# Patient Record
Sex: FEMALE | Race: WHITE | Employment: FULL TIME | URBAN - METROPOLITAN AREA
[De-identification: names, ages, dates, MRNs, and addresses within clinical notes are randomized per-mention and may not be internally consistent; named-entity substitution may affect disease eponyms.]

---

## 2017-05-16 ENCOUNTER — OFFICE VISIT (OUTPATIENT)
Dept: OBGYN CLINIC | Facility: CLINIC | Age: 22
End: 2017-05-16

## 2017-05-16 VITALS
WEIGHT: 143 LBS | DIASTOLIC BLOOD PRESSURE: 72 MMHG | SYSTOLIC BLOOD PRESSURE: 106 MMHG | HEART RATE: 92 BPM | BODY MASS INDEX: 25 KG/M2

## 2017-05-16 DIAGNOSIS — Z11.3 SCREEN FOR STD (SEXUALLY TRANSMITTED DISEASE): ICD-10-CM

## 2017-05-16 DIAGNOSIS — N89.8 VAGINAL DISCHARGE: ICD-10-CM

## 2017-05-16 DIAGNOSIS — Z01.411 ENCOUNTER FOR GYNECOLOGICAL EXAMINATION WITH ABNORMAL FINDING: Primary | ICD-10-CM

## 2017-05-16 DIAGNOSIS — Z30.011 ENCOUNTER FOR INITIAL PRESCRIPTION OF CONTRACEPTIVE PILLS: ICD-10-CM

## 2017-05-16 PROCEDURE — 99385 PREV VISIT NEW AGE 18-39: CPT | Performed by: ADVANCED PRACTICE MIDWIFE

## 2017-05-16 RX ORDER — LEVONORGESTREL / ETHINYL ESTRADIOL AND ETHINYL ESTRADIOL 150-30(84)
1 KIT ORAL DAILY
Qty: 1 PACKAGE | Refills: 11 | Status: SHIPPED | OUTPATIENT
Start: 2017-05-16 | End: 2017-08-15

## 2017-05-17 ENCOUNTER — LAB ENCOUNTER (OUTPATIENT)
Dept: LAB | Facility: HOSPITAL | Age: 22
End: 2017-05-17
Attending: Other
Payer: COMMERCIAL

## 2017-05-17 ENCOUNTER — HOSPITAL ENCOUNTER (OUTPATIENT)
Age: 22
Discharge: HOME | End: 2017-05-17
Payer: COMMERCIAL

## 2017-05-17 VITALS
BODY MASS INDEX: 23.54 KG/M2 | OXYGEN SATURATION: 100 % | SYSTOLIC BLOOD PRESSURE: 120 MMHG | WEIGHT: 143 LBS | HEIGHT: 65.5 IN | RESPIRATION RATE: 12 BRPM | HEART RATE: 95 BPM | TEMPERATURE: 98 F | DIASTOLIC BLOOD PRESSURE: 75 MMHG

## 2017-05-17 DIAGNOSIS — Z79.899 NEED FOR PROPHYLACTIC CHEMOTHERAPY: ICD-10-CM

## 2017-05-17 DIAGNOSIS — R53.83 FATIGUE: Primary | ICD-10-CM

## 2017-05-17 DIAGNOSIS — J02.0 STREP PHARYNGITIS: Primary | ICD-10-CM

## 2017-05-17 PROCEDURE — 87430 STREP A AG IA: CPT

## 2017-05-17 PROCEDURE — 99204 OFFICE O/P NEW MOD 45 MIN: CPT

## 2017-05-17 PROCEDURE — 84443 ASSAY THYROID STIM HORMONE: CPT

## 2017-05-17 PROCEDURE — 99213 OFFICE O/P EST LOW 20 MIN: CPT

## 2017-05-17 PROCEDURE — 82607 VITAMIN B-12: CPT

## 2017-05-17 PROCEDURE — 36415 COLL VENOUS BLD VENIPUNCTURE: CPT

## 2017-05-17 PROCEDURE — 82306 VITAMIN D 25 HYDROXY: CPT

## 2017-05-17 PROCEDURE — 85025 COMPLETE CBC W/AUTO DIFF WBC: CPT

## 2017-05-17 PROCEDURE — 82728 ASSAY OF FERRITIN: CPT

## 2017-05-17 PROCEDURE — 80053 COMPREHEN METABOLIC PANEL: CPT

## 2017-05-17 PROCEDURE — 80061 LIPID PANEL: CPT

## 2017-05-17 RX ORDER — ALBUTEROL SULFATE 90 UG/1
2 AEROSOL, METERED RESPIRATORY (INHALATION) EVERY 4 HOURS PRN
Qty: 1 INHALER | Refills: 0 | Status: SHIPPED | OUTPATIENT
Start: 2017-05-17 | End: 2017-06-16

## 2017-05-17 RX ORDER — AZITHROMYCIN 500 MG/1
500 TABLET, FILM COATED ORAL DAILY
Qty: 5 TABLET | Refills: 0 | Status: SHIPPED | OUTPATIENT
Start: 2017-05-17 | End: 2017-05-22

## 2017-05-17 RX ORDER — PREDNISONE 20 MG/1
40 TABLET ORAL DAILY
Qty: 8 TABLET | Refills: 0 | Status: SHIPPED | OUTPATIENT
Start: 2017-05-18 | End: 2017-05-22

## 2017-05-17 RX ORDER — PREDNISONE 20 MG/1
40 TABLET ORAL ONCE
Status: COMPLETED | OUTPATIENT
Start: 2017-05-17 | End: 2017-05-17

## 2017-05-17 NOTE — ED PROVIDER NOTES
Patient Seen in: [facility]    History   Patient presents with:  Sore Throat  Cough/URI    Stated Complaint: sore throat/cough    HPI  Patient is a 43-year-old female with a history of asthma who presents for evaluation o • Lung Disorder Maternal Grandfather    • Dementia Paternal Grandmother    • Cancer Paternal Grandfather          Smoking Status: Never Smoker                      Alcohol Use: Yes           0.0 oz/week       0 Standard drinks or equivalent per week light.   Neck: Normal range of motion. Neck supple. Cardiovascular: Normal rate, regular rhythm, normal heart sounds and intact distal pulses. Pulmonary/Chest: Effort normal and breath sounds normal. No respiratory distress. She has no wheezes.  She ha

## 2017-05-18 ENCOUNTER — TELEPHONE (OUTPATIENT)
Dept: OBGYN CLINIC | Facility: CLINIC | Age: 22
End: 2017-05-18

## 2017-05-18 NOTE — TELEPHONE ENCOUNTER
Pt asking for pap smear results. Informed pt that our office will call her back with results once MES, CNM review them first. Pt verbalized understanding and agrees with plan.

## 2017-05-20 ENCOUNTER — TELEPHONE (OUTPATIENT)
Dept: OBGYN CLINIC | Facility: CLINIC | Age: 22
End: 2017-05-20

## 2017-05-20 NOTE — TELEPHONE ENCOUNTER
Per velasquez Gonzalez to inform patient of negative Pap, GC/CT, and vaginosis screen results. Patient informed and she verbalized understanding.

## 2017-05-22 NOTE — PROGRESS NOTES
HPI:    Patient ID: Latasha Womack is a 20year old female who presents for her annual exam. Desires OCP. Pt reports vaginal d/c. HPI    Review of Systems   Constitutional: Negative. Respiratory: Negative. Cardiovascular: Negative.     [employer] the left labia. Cervix exhibits no motion tenderness and no discharge. No tenderness or bleeding in the vagina. No foreign body around the vagina. Vaginal discharge found. Neurological: She is oriented to person, place, and time.    Skin: Skin is warm and

## 2017-06-13 ENCOUNTER — TELEPHONE (OUTPATIENT)
Dept: OBGYN CLINIC | Facility: CLINIC | Age: 22
End: 2017-06-13

## 2017-06-13 NOTE — TELEPHONE ENCOUNTER
Pt states she was started on new ocp & was feeling depressed so she stopped. Now having a bit of cramping & spotting. Reassured pt that it was normal. Advised pt to use another form of birth control as she is not protected.  Pt explained that she was placed

## 2017-12-20 ENCOUNTER — HOSPITAL ENCOUNTER (OUTPATIENT)
Age: 22
Discharge: HOME | End: 2017-12-20
Attending: FAMILY MEDICINE
Payer: COMMERCIAL

## 2017-12-20 VITALS
HEART RATE: 78 BPM | RESPIRATION RATE: 16 BRPM | TEMPERATURE: 98 F | OXYGEN SATURATION: 100 % | DIASTOLIC BLOOD PRESSURE: 72 MMHG | SYSTOLIC BLOOD PRESSURE: 126 MMHG

## 2017-12-20 DIAGNOSIS — J02.9 ACUTE VIRAL PHARYNGITIS: Primary | ICD-10-CM

## 2017-12-20 DIAGNOSIS — T14.8XXA INFECTED ABRASION: ICD-10-CM

## 2017-12-20 DIAGNOSIS — L08.9 INFECTED ABRASION: ICD-10-CM

## 2017-12-20 PROCEDURE — 99214 OFFICE O/P EST MOD 30 MIN: CPT

## 2017-12-20 PROCEDURE — 87430 STREP A AG IA: CPT

## 2017-12-20 PROCEDURE — 99213 OFFICE O/P EST LOW 20 MIN: CPT

## 2017-12-20 RX ORDER — AMOXICILLIN AND CLAVULANATE POTASSIUM 875; 125 MG/1; MG/1
1 TABLET, FILM COATED ORAL 2 TIMES DAILY
Qty: 14 TABLET | Refills: 0 | Status: SHIPPED | OUTPATIENT
Start: 2017-12-20 | End: 2017-12-27

## 2017-12-20 RX ORDER — AMOXICILLIN AND CLAVULANATE POTASSIUM 875; 125 MG/1; MG/1
1 TABLET, FILM COATED ORAL 2 TIMES DAILY
Qty: 14 TABLET | Refills: 0 | Status: SHIPPED | OUTPATIENT
Start: 2017-12-20 | End: 2017-12-20

## 2017-12-21 NOTE — ED PROVIDER NOTES
Patient Seen in: [facility]    History   Patient presents with:  Sore Throat    Stated Complaint: Sore Throat    HPI    Here with a sore throat. ×1 day. Denies any fevers, chills, sweats. Denies any other URI symptoms. ED Course as of Dec 20 2009  ------------------------------------------------------------       [facility]       Disposition and Plan     Clinical Impression:  Acute viral pharyngitis  (primary encounter diagnosis)  Infected abrasion     Likely viral pharyn

## 2024-02-20 ENCOUNTER — HOSPITAL ENCOUNTER (EMERGENCY)
Facility: HOSPITAL | Age: 29
Discharge: HOME | End: 2024-02-20
Attending: EMERGENCY MEDICINE
Payer: MEDICAID

## 2024-02-20 ENCOUNTER — APPOINTMENT (OUTPATIENT)
Dept: GENERAL RADIOLOGY | Facility: HOSPITAL | Age: 29
End: 2024-02-20
Payer: MEDICAID

## 2024-02-20 ENCOUNTER — APPOINTMENT (OUTPATIENT)
Dept: ULTRASOUND IMAGING | Facility: HOSPITAL | Age: 29
End: 2024-02-20
Attending: EMERGENCY MEDICINE
Payer: MEDICAID

## 2024-02-20 VITALS
BODY MASS INDEX: 34.16 KG/M2 | RESPIRATION RATE: 18 BRPM | HEART RATE: 58 BPM | WEIGHT: 205 LBS | HEIGHT: 65 IN | SYSTOLIC BLOOD PRESSURE: 131 MMHG | OXYGEN SATURATION: 99 % | TEMPERATURE: 98 F | DIASTOLIC BLOOD PRESSURE: 93 MMHG

## 2024-02-20 DIAGNOSIS — R07.9 ACUTE CHEST PAIN: Primary | ICD-10-CM

## 2024-02-20 LAB
ALBUMIN SERPL-MCNC: 4.5 G/DL (ref 3.2–4.8)
ALBUMIN/GLOB SERPL: 1.8 {RATIO} (ref 1–2)
ALP LIVER SERPL-CCNC: 114 U/L
ALT SERPL-CCNC: 41 U/L
ANION GAP SERPL CALC-SCNC: 5 MMOL/L (ref 0–18)
AST SERPL-CCNC: 26 U/L (ref ?–34)
ATRIAL RATE: 60 BPM
B-HCG UR QL: NEGATIVE
BASOPHILS # BLD AUTO: 0.03 X10(3) UL (ref 0–0.2)
BASOPHILS NFR BLD AUTO: 0.3 %
BILIRUB SERPL-MCNC: 0.6 MG/DL (ref 0.3–1.2)
BUN BLD-MCNC: 10 MG/DL (ref 9–23)
BUN/CREAT SERPL: 16.1 (ref 10–20)
CALCIUM BLD-MCNC: 9.6 MG/DL (ref 8.7–10.4)
CHLORIDE SERPL-SCNC: 107 MMOL/L (ref 98–112)
CO2 SERPL-SCNC: 24 MMOL/L (ref 21–32)
CREAT BLD-MCNC: 0.62 MG/DL
DEPRECATED RDW RBC AUTO: 36.9 FL (ref 35.1–46.3)
EGFRCR SERPLBLD CKD-EPI 2021: 124 ML/MIN/1.73M2 (ref 60–?)
EOSINOPHIL # BLD AUTO: 0.1 X10(3) UL (ref 0–0.7)
EOSINOPHIL NFR BLD AUTO: 0.9 %
ERYTHROCYTE [DISTWIDTH] IN BLOOD BY AUTOMATED COUNT: 11.8 % (ref 11–15)
GLOBULIN PLAS-MCNC: 2.5 G/DL (ref 2.8–4.4)
GLUCOSE BLD-MCNC: 99 MG/DL (ref 70–99)
HCT VFR BLD AUTO: 40.9 %
HGB BLD-MCNC: 14.5 G/DL
IMM GRANULOCYTES # BLD AUTO: 0.02 X10(3) UL (ref 0–1)
IMM GRANULOCYTES NFR BLD: 0.2 %
LIPASE SERPL-CCNC: 30 U/L (ref 13–75)
LYMPHOCYTES # BLD AUTO: 2 X10(3) UL (ref 1–4)
LYMPHOCYTES NFR BLD AUTO: 18.9 %
MCH RBC QN AUTO: 30.5 PG (ref 26–34)
MCHC RBC AUTO-ENTMCNC: 35.5 G/DL (ref 31–37)
MCV RBC AUTO: 85.9 FL
MONOCYTES # BLD AUTO: 0.81 X10(3) UL (ref 0.1–1)
MONOCYTES NFR BLD AUTO: 7.6 %
NEUTROPHILS # BLD AUTO: 7.64 X10 (3) UL (ref 1.5–7.7)
NEUTROPHILS # BLD AUTO: 7.64 X10(3) UL (ref 1.5–7.7)
NEUTROPHILS NFR BLD AUTO: 72.1 %
OSMOLALITY SERPL CALC.SUM OF ELEC: 281 MOSM/KG (ref 275–295)
P AXIS: 10 DEGREES
P-R INTERVAL: 116 MS
PLATELET # BLD AUTO: 347 10(3)UL (ref 150–450)
POTASSIUM SERPL-SCNC: 4.3 MMOL/L (ref 3.5–5.1)
PROT SERPL-MCNC: 7 G/DL (ref 5.7–8.2)
Q-T INTERVAL: 420 MS
QRS DURATION: 86 MS
QTC CALCULATION (BEZET): 420 MS
R AXIS: 31 DEGREES
RBC # BLD AUTO: 4.76 X10(6)UL
SODIUM SERPL-SCNC: 136 MMOL/L (ref 136–145)
T AXIS: 13 DEGREES
TROPONIN I SERPL HS-MCNC: <3 NG/L
VENTRICULAR RATE: 60 BPM
WBC # BLD AUTO: 10.6 X10(3) UL (ref 4–11)

## 2024-02-20 PROCEDURE — 71045 X-RAY EXAM CHEST 1 VIEW: CPT | Performed by: EMERGENCY MEDICINE

## 2024-02-20 PROCEDURE — 80053 COMPREHEN METABOLIC PANEL: CPT

## 2024-02-20 PROCEDURE — 36415 COLL VENOUS BLD VENIPUNCTURE: CPT

## 2024-02-20 PROCEDURE — 99285 EMERGENCY DEPT VISIT HI MDM: CPT

## 2024-02-20 PROCEDURE — 84484 ASSAY OF TROPONIN QUANT: CPT | Performed by: EMERGENCY MEDICINE

## 2024-02-20 PROCEDURE — 81025 URINE PREGNANCY TEST: CPT

## 2024-02-20 PROCEDURE — 85025 COMPLETE CBC W/AUTO DIFF WBC: CPT | Performed by: EMERGENCY MEDICINE

## 2024-02-20 PROCEDURE — 93010 ELECTROCARDIOGRAM REPORT: CPT

## 2024-02-20 PROCEDURE — 93005 ELECTROCARDIOGRAM TRACING: CPT

## 2024-02-20 PROCEDURE — 80053 COMPREHEN METABOLIC PANEL: CPT | Performed by: EMERGENCY MEDICINE

## 2024-02-20 PROCEDURE — 99284 EMERGENCY DEPT VISIT MOD MDM: CPT

## 2024-02-20 PROCEDURE — 85025 COMPLETE CBC W/AUTO DIFF WBC: CPT

## 2024-02-20 PROCEDURE — 76705 ECHO EXAM OF ABDOMEN: CPT | Performed by: EMERGENCY MEDICINE

## 2024-02-20 PROCEDURE — 84484 ASSAY OF TROPONIN QUANT: CPT

## 2024-02-20 PROCEDURE — 83690 ASSAY OF LIPASE: CPT | Performed by: EMERGENCY MEDICINE

## 2024-02-20 RX ORDER — DICYCLOMINE HYDROCHLORIDE 10 MG/5ML
20 SOLUTION ORAL ONCE
Status: COMPLETED | OUTPATIENT
Start: 2024-02-20 | End: 2024-02-20

## 2024-02-20 RX ORDER — MAGNESIUM HYDROXIDE/ALUMINUM HYDROXICE/SIMETHICONE 120; 1200; 1200 MG/30ML; MG/30ML; MG/30ML
30 SUSPENSION ORAL ONCE
Status: COMPLETED | OUTPATIENT
Start: 2024-02-20 | End: 2024-02-20

## 2024-02-20 NOTE — ED PROVIDER NOTES
Patient Seen in: [facility] Emergency Department      History     Chief Complaint   Patient presents with    Chest Pain     Stated Complaint: chest pain    Subjective:   HPI    28-year-old female presents for evaluation for chest pain.  Pain began last night, was retrosternal, sharp, radiated to the arm.  It is currently better.  Mother endorses that she ate a lot of cheese prior to onset of her symptoms.  She does report some occasional heartburn.  There was some shortness of breath with it.  She also felt nauseated.  She denies any fevers, chills, vomiting, diarrhea, constipation, dysuria, hematuria, leg pain or swelling, recent travel or prolonged immobilization, smoking, use of birth control.  Mother reports a family history of gallstones.    Objective:   Past Medical History:   Diagnosis Date    Depression 2012    Lipid screening 08/31/2013    Mild intermittent asthma (HCC)     Multiple allergies               Past Surgical History:   Procedure Laterality Date    OTHER SURGICAL HISTORY Left 2009    Facial cyst removed                Social History     Socioeconomic History    Marital status: Single   Tobacco Use    Smoking status: Never    Smokeless tobacco: Never   Substance and Sexual Activity    Alcohol use: Yes     Alcohol/week: 0.0 standard drinks of alcohol     Comment: sometimes    Drug use: No   Other Topics Concern    Caffeine Concern Yes     Comment: 1 cups coffee daily/3 sodas weekly    Reaction to local anesthetic No              Review of Systems    Positive for stated complaint: chest pain  Other systems are as noted in HPI.  Constitutional and vital signs reviewed.      All other systems reviewed and negative except as noted above.    Physical Exam     ED Triage Vitals [02/20/24 0917]   /79   Pulse 69   Resp 20   Temp 98.2 °F (36.8 °C)   Temp src Oral   SpO2 96 %   O2 Device None (Room air)       Current:/89   Pulse 57   Temp 98.2 °F (36.8 °C) (Oral)   Resp 18   Ht 165.1  cm (5' 5\")   Wt 93 kg   LMP 02/11/2024   SpO2 98%   BMI 34.11 kg/m²         Physical Exam  Vitals and nursing note reviewed.   Constitutional:       Appearance: Normal appearance.   HENT:      Head: Normocephalic and atraumatic.   Cardiovascular:      Rate and Rhythm: Normal rate and regular rhythm.      Pulses: Normal pulses.           Radial pulses are 2+ on the right side and 2+ on the left side.      Heart sounds: Normal heart sounds.   Pulmonary:      Effort: Pulmonary effort is normal.      Breath sounds: Normal breath sounds and air entry.   Abdominal:      General: Bowel sounds are normal.      Palpations: Abdomen is soft.      Tenderness: There is abdominal tenderness in the epigastric area. There is no guarding or rebound.   Musculoskeletal:         General: Normal range of motion.      Cervical back: Normal range of motion.      Right lower leg: No edema.      Left lower leg: No edema.   Skin:     General: Skin is warm and dry.   Neurological:      General: No focal deficit present.      Mental Status: She is alert.               ED Course     Labs Reviewed   COMP METABOLIC PANEL (14) - Abnormal; Notable for the following components:       Result Value    Alkaline Phosphatase 114 (*)     Globulin  2.5 (*)     All other components within normal limits   TROPONIN I HIGH SENSITIVITY - Normal   LIPASE - Normal   POCT PREGNANCY URINE - Normal   CBC WITH DIFFERENTIAL WITH PLATELET    Narrative:     The following orders were created for panel order CBC With Differential With Platelet.  Procedure                               Abnormality         Status                     ---------                               -----------         ------                     CBC W/ DIFFERENTIAL[[id number]]                              Final result                 Please view results for these tests on the individual orders.   CBC W/ DIFFERENTIAL     EKG    Rate, intervals and axes as noted on EKG Report.  Rate: 60  Rhythm:  Sinus Rhythm  Reading: no stemi, interpreted by myself.              ED Course as of 02/20/24 1303  ------------------------------------------------------------  Time: 02/20 1059  Value: XR CHEST AP PORTABLE  (CPT=71045)  Comment: Per my independent interpretation, patient's CXR demonstrates no pneumonia.    ------------------------------------------------------------  Time: 02/20 1256  Value: US GALLBLADDER (CPT=76705)  Comment: Per my independent interpretation, patient's US Gallbladder demonstrates no gallstones.                MDM                                         Medical Decision Making  Differential diagnosis includes but is not limited to ACS, pneumonia, PE, pneumothorax, costochondritis, referred abdominal pain    Patient is PERC negative, I do not suspect PE.  CT chest considered for PE but not obtained given that patient is PERC negative.  CBC and CMP were unremarkable.  Lipase is normal.  I do not suspect pancreatitis.  Ultrasound negative for any cholelithiasis.  Chest x-ray is also negative.  Troponin normal, do not suspect any ACS.  Unsure etiology for symptoms at this time, could have been gastritis or reflux.  She is currently feeling better.  She is discharged with dietary instructions, return precautions and is advised to follow-up with PCP.    Rhythm Strip: Rate 58 sinus rhythm. The cardiac monitor was ordered secondary to the patient's chest pain.     Medical Record Review: I personally reviewed available prior medical records for any recent pertinent discharge summaries, testing, and procedures, and reviewed those reports.    Complicating factors: The patient  has a past medical history of Depression (2012), Lipid screening (08/31/2013), Mild intermittent asthma (HCC), and Multiple allergies. and  has a past surgical history that includes other surgical history (Left, 2009). that contribute to the medical complexity of this ED evaluation.     Clinical impression as well as any lab results  and radiology findings were discussed with the patient and/or caregiver. I personally reviewed all laboratory results and radiology images myself. Patient is advised to follow up with PCP for reevaluation. I provided discharge instructions and return precautions. Patient and/or caregiver voices understanding and agreement with the treatment plan. All questions were addressed and answered.         Problems Addressed:  Acute chest pain: acute illness or injury with systemic symptoms    Amount and/or Complexity of Data Reviewed  Independent Historian: parent  Labs: ordered. Decision-making details documented in ED Course.  Radiology: ordered and independent interpretation performed. Decision-making details documented in ED Course.  ECG/medicine tests: ordered and independent interpretation performed. Decision-making details documented in ED Course.    Risk  OTC drugs.        Disposition and Plan     Clinical Impression:  1. Acute chest pain         Disposition:  Discharge  2/20/2024  1:02 pm    Follow-up:  Pablo Ferreira MD  [address] [place] [address]  [phone]    Schedule an appointment as soon as possible for a visit  For follow up and re-evaluation          Medications Prescribed:  Current Discharge Medication List

## 2024-02-20 NOTE — ED INITIAL ASSESSMENT (HPI)
Pt with history of asthma came in for intermittent  \"horrible\" chest pain since last night.  With nausea.  With mild shortness of breath.  Pt is A/Ox  4, breathing unlabored.  Took Advil 3 tabs last night, per pt it did not help.  Pt denies cough, congestion, fever.

## 2024-04-11 ENCOUNTER — OFFICE VISIT (OUTPATIENT)
Dept: INTERNAL MEDICINE CLINIC | Facility: CLINIC | Age: 29
End: 2024-04-11

## 2024-04-11 ENCOUNTER — TELEPHONE (OUTPATIENT)
Dept: INTERNAL MEDICINE CLINIC | Facility: CLINIC | Age: 29
End: 2024-04-11

## 2024-04-11 ENCOUNTER — ORDER TRANSCRIPTION (OUTPATIENT)
Dept: ADMINISTRATIVE | Facility: HOSPITAL | Age: 29
End: 2024-04-11

## 2024-04-11 ENCOUNTER — TELEPHONE (OUTPATIENT)
Dept: ENDOCRINOLOGY | Facility: HOSPITAL | Age: 29
End: 2024-04-11

## 2024-04-11 ENCOUNTER — LAB ENCOUNTER (OUTPATIENT)
Dept: LAB | Age: 29
End: 2024-04-11
Attending: NURSE PRACTITIONER
Payer: MEDICAID

## 2024-04-11 VITALS
BODY MASS INDEX: 32.32 KG/M2 | HEIGHT: 65 IN | HEART RATE: 79 BPM | DIASTOLIC BLOOD PRESSURE: 74 MMHG | WEIGHT: 194 LBS | SYSTOLIC BLOOD PRESSURE: 112 MMHG

## 2024-04-11 DIAGNOSIS — Z13.29 THYROID DISORDER SCREEN: ICD-10-CM

## 2024-04-11 DIAGNOSIS — Z13.0 SCREENING FOR DEFICIENCY ANEMIA: ICD-10-CM

## 2024-04-11 DIAGNOSIS — Z11.3 VENEREAL DISEASE SCREENING: ICD-10-CM

## 2024-04-11 DIAGNOSIS — Z13.21 ENCOUNTER FOR VITAMIN DEFICIENCY SCREENING: ICD-10-CM

## 2024-04-11 DIAGNOSIS — Z12.4 ENCOUNTER FOR SCREENING FOR MALIGNANT NEOPLASM OF CERVIX: ICD-10-CM

## 2024-04-11 DIAGNOSIS — Z00.00 WELLNESS EXAMINATION: ICD-10-CM

## 2024-04-11 DIAGNOSIS — E66.9 OBESITY (BMI 30.0-34.9): Primary | ICD-10-CM

## 2024-04-11 DIAGNOSIS — F31.9 BIPOLAR AFFECTIVE DISORDER, REMISSION STATUS UNSPECIFIED (HCC): ICD-10-CM

## 2024-04-11 DIAGNOSIS — Z13.220 LIPID SCREENING: ICD-10-CM

## 2024-04-11 DIAGNOSIS — D72.829 LEUKOCYTOSIS, UNSPECIFIED TYPE: Primary | ICD-10-CM

## 2024-04-11 DIAGNOSIS — E66.9 OBESITY: Primary | ICD-10-CM

## 2024-04-11 DIAGNOSIS — N76.0 VAGINOSIS: ICD-10-CM

## 2024-04-11 DIAGNOSIS — R79.89 ELEVATED LFTS: ICD-10-CM

## 2024-04-11 DIAGNOSIS — E66.9 OBESITY (BMI 30.0-34.9): ICD-10-CM

## 2024-04-11 DIAGNOSIS — J45.909 EXTRINSIC ASTHMA WITHOUT COMPLICATION, UNSPECIFIED ASTHMA SEVERITY, UNSPECIFIED WHETHER PERSISTENT (HCC): ICD-10-CM

## 2024-04-11 DIAGNOSIS — K52.9 CHRONIC DIARRHEA: ICD-10-CM

## 2024-04-11 DIAGNOSIS — R35.0 URINARY FREQUENCY: ICD-10-CM

## 2024-04-11 DIAGNOSIS — Z00.00 WELLNESS EXAMINATION: Primary | ICD-10-CM

## 2024-04-11 DIAGNOSIS — N88.8 FRIABLE CERVIX: ICD-10-CM

## 2024-04-11 LAB
ALBUMIN SERPL-MCNC: 5.2 G/DL (ref 3.2–4.8)
ALBUMIN/GLOB SERPL: 1.9 {RATIO} (ref 1–2)
ALP LIVER SERPL-CCNC: 121 U/L
ALT SERPL-CCNC: 42 U/L
ANION GAP SERPL CALC-SCNC: 12 MMOL/L (ref 0–18)
AST SERPL-CCNC: 71 U/L (ref ?–34)
BASOPHILS # BLD AUTO: 0.04 X10(3) UL (ref 0–0.2)
BASOPHILS NFR BLD AUTO: 0.3 %
BILIRUB SERPL-MCNC: 1.1 MG/DL (ref 0.3–1.2)
BILIRUB UR QL: NEGATIVE
BUN BLD-MCNC: 11 MG/DL (ref 9–23)
BUN/CREAT SERPL: 16.9 (ref 10–20)
C TRACH DNA SPEC QL NAA+PROBE: NEGATIVE
CALCIUM BLD-MCNC: 9.9 MG/DL (ref 8.7–10.4)
CHLORIDE SERPL-SCNC: 101 MMOL/L (ref 98–112)
CHOLEST SERPL-MCNC: 193 MG/DL (ref ?–200)
CLARITY UR: CLEAR
CO2 SERPL-SCNC: 23 MMOL/L (ref 21–32)
CREAT BLD-MCNC: 0.65 MG/DL
DEPRECATED RDW RBC AUTO: 37.2 FL (ref 35.1–46.3)
EGFRCR SERPLBLD CKD-EPI 2021: 122 ML/MIN/1.73M2 (ref 60–?)
EOSINOPHIL # BLD AUTO: 0.11 X10(3) UL (ref 0–0.7)
EOSINOPHIL NFR BLD AUTO: 0.9 %
ERYTHROCYTE [DISTWIDTH] IN BLOOD BY AUTOMATED COUNT: 11.8 % (ref 11–15)
EST. AVERAGE GLUCOSE BLD GHB EST-MCNC: 91 MG/DL (ref 68–126)
FASTING PATIENT LIPID ANSWER: YES
FASTING STATUS PATIENT QL REPORTED: YES
GLOBULIN PLAS-MCNC: 2.7 G/DL (ref 2.8–4.4)
GLUCOSE BLD-MCNC: 61 MG/DL (ref 70–99)
GLUCOSE UR-MCNC: NORMAL MG/DL
HAV IGM SER QL: NONREACTIVE
HBA1C MFR BLD: 4.8 % (ref ?–5.7)
HBV CORE IGM SER QL: NONREACTIVE
HBV SURFACE AG SERPL QL IA: NONREACTIVE
HCT VFR BLD AUTO: 43.1 %
HCV AB SERPL QL IA: NONREACTIVE
HDLC SERPL-MCNC: 57 MG/DL (ref 40–59)
HGB BLD-MCNC: 14.9 G/DL
IMM GRANULOCYTES # BLD AUTO: 0.03 X10(3) UL (ref 0–1)
IMM GRANULOCYTES NFR BLD: 0.2 %
KETONES UR-MCNC: 60 MG/DL
LDLC SERPL CALC-MCNC: 121 MG/DL (ref ?–100)
LEUKOCYTE ESTERASE UR QL STRIP.AUTO: 25
LYMPHOCYTES # BLD AUTO: 2.16 X10(3) UL (ref 1–4)
LYMPHOCYTES NFR BLD AUTO: 17.7 %
MCH RBC QN AUTO: 29.9 PG (ref 26–34)
MCHC RBC AUTO-ENTMCNC: 34.6 G/DL (ref 31–37)
MCV RBC AUTO: 86.5 FL
MONOCYTES # BLD AUTO: 0.78 X10(3) UL (ref 0.1–1)
MONOCYTES NFR BLD AUTO: 6.4 %
N GONORRHOEA DNA SPEC QL NAA+PROBE: NEGATIVE
NEUTROPHILS # BLD AUTO: 9.06 X10 (3) UL (ref 1.5–7.7)
NEUTROPHILS # BLD AUTO: 9.06 X10(3) UL (ref 1.5–7.7)
NEUTROPHILS NFR BLD AUTO: 74.5 %
NITRITE UR QL STRIP.AUTO: NEGATIVE
NONHDLC SERPL-MCNC: 136 MG/DL (ref ?–130)
OSMOLALITY SERPL CALC.SUM OF ELEC: 279 MOSM/KG (ref 275–295)
PH UR: 5 [PH] (ref 5–8)
PLATELET # BLD AUTO: 379 10(3)UL (ref 150–450)
POTASSIUM SERPL-SCNC: 3.6 MMOL/L (ref 3.5–5.1)
PROT SERPL-MCNC: 7.9 G/DL (ref 5.7–8.2)
PROT UR-MCNC: NEGATIVE MG/DL
RBC # BLD AUTO: 4.98 X10(6)UL
SODIUM SERPL-SCNC: 136 MMOL/L (ref 136–145)
SP GR UR STRIP: 1.01 (ref 1–1.03)
T PALLIDUM AB SER QL IA: NONREACTIVE
TRIGL SERPL-MCNC: 85 MG/DL (ref 30–149)
TSI SER-ACNC: 1.31 MIU/ML (ref 0.55–4.78)
UROBILINOGEN UR STRIP-ACNC: NORMAL
VIT D+METAB SERPL-MCNC: 37.8 NG/ML (ref 30–100)
VLDLC SERPL CALC-MCNC: 15 MG/DL (ref 0–30)
WBC # BLD AUTO: 12.2 X10(3) UL (ref 4–11)

## 2024-04-11 PROCEDURE — 80061 LIPID PANEL: CPT

## 2024-04-11 PROCEDURE — 81514 NFCT DS BV&VAGINITIS DNA ALG: CPT | Performed by: NURSE PRACTITIONER

## 2024-04-11 PROCEDURE — 87491 CHLMYD TRACH DNA AMP PROBE: CPT

## 2024-04-11 PROCEDURE — 81001 URINALYSIS AUTO W/SCOPE: CPT

## 2024-04-11 PROCEDURE — 85025 COMPLETE CBC W/AUTO DIFF WBC: CPT

## 2024-04-11 PROCEDURE — 82306 VITAMIN D 25 HYDROXY: CPT

## 2024-04-11 PROCEDURE — 87624 HPV HI-RISK TYP POOLED RSLT: CPT | Performed by: NURSE PRACTITIONER

## 2024-04-11 PROCEDURE — 87389 HIV-1 AG W/HIV-1&-2 AB AG IA: CPT

## 2024-04-11 PROCEDURE — 83036 HEMOGLOBIN GLYCOSYLATED A1C: CPT

## 2024-04-11 PROCEDURE — 86780 TREPONEMA PALLIDUM: CPT

## 2024-04-11 PROCEDURE — 80074 ACUTE HEPATITIS PANEL: CPT

## 2024-04-11 PROCEDURE — 36415 COLL VENOUS BLD VENIPUNCTURE: CPT

## 2024-04-11 PROCEDURE — 84443 ASSAY THYROID STIM HORMONE: CPT

## 2024-04-11 PROCEDURE — 80053 COMPREHEN METABOLIC PANEL: CPT

## 2024-04-11 PROCEDURE — 87086 URINE CULTURE/COLONY COUNT: CPT

## 2024-04-11 PROCEDURE — 87591 N.GONORRHOEAE DNA AMP PROB: CPT

## 2024-04-11 RX ORDER — SERTRALINE HYDROCHLORIDE 100 MG/1
150 TABLET, FILM COATED ORAL DAILY
COMMUNITY
Start: 2024-03-22

## 2024-04-11 RX ORDER — LISDEXAMFETAMINE DIMESYLATE 30 MG/1
CAPSULE ORAL
COMMUNITY
Start: 2024-04-09

## 2024-04-11 RX ORDER — PRAZOSIN HYDROCHLORIDE 2 MG/1
2 CAPSULE ORAL NIGHTLY PRN
COMMUNITY
Start: 2024-03-21

## 2024-04-11 RX ORDER — LEVOCETIRIZINE DIHYDROCHLORIDE 5 MG/1
5 TABLET, FILM COATED ORAL EVERY EVENING
Qty: 30 TABLET | Refills: 1 | Status: SHIPPED | OUTPATIENT
Start: 2024-04-11

## 2024-04-11 RX ORDER — ALBUTEROL SULFATE 90 UG/1
2 AEROSOL, METERED RESPIRATORY (INHALATION)
Qty: 17 G | Refills: 2 | Status: SHIPPED | OUTPATIENT
Start: 2024-04-11

## 2024-04-11 NOTE — TELEPHONE ENCOUNTER
Returned pt's call to schedule appt w/Jenniffer Goss. Explained to patient Jenniffer doesn't see out patients, she facilitates our [facility] program.  Pt wasn't interested in that, she only wants a dietician who can prescribe Ozempic. I explained our dieticians would not be able to prescribe medication.  I gave patient the [facility] phone number to see a provider there for weight loss medication.

## 2024-04-11 NOTE — PROGRESS NOTES
Melida Toledo is a 28 year old female.  HPI:   Pt is new to clinic, here to establish care  Dx with [facility] 01/2023, was started on olanzapine and reports 50 lb weight gain in 3 months. No longer on it. Has lost 12 lbs so far and planning to increase weight loss with diet , Vyvanse and exercise. Currently taking Vyvanse and Zoloft. She is interested in starting ozempic.   Reports she gets diarrhea about 2x/month that lasts 4 days each time for a few years. Never saw GI for this.   Also reports urinary frequency, going every hour for the past 4 months. She denies any blood in urine, pelvic pain, fever, chills, flank pain. She reports regular menses.   Requests vaginal culture, noticed an odor but not consistent, no vaginal bleeding, reports hx of normal pap smears, has been 5 years since last one. Denies any vaginal discharge, sexually active, no new partners, not concerned for STI but would like testing.   Reports allergy induced asthma, using montelukast, xyzal and albuterol PRN, denies any recent exacerbation in past year, no albuterol use recently, sx well controlled on antihistamine.   Current Outpatient Medications   Medication Sig Dispense Refill    VYVANSE 30 MG Oral Cap       prazosin 2 MG Oral Cap Take 1 capsule (2 mg total) by mouth nightly as needed.      sertraline 100 MG Oral Tab Take 1.5 tablets (150 mg total) by mouth daily.      levocetirizine 5 MG Oral Tab Take 1 tablet (5 mg total) by mouth every evening. 30 tablet 1    albuterol (PROAIR HFA) 108 (90 Base) MCG/ACT Inhalation Aero Soln Inhale 2 puffs into the lungs every 4 to 6 hours as needed. 17 g 2    Montelukast Sodium (SINGULAIR) 10 MG Oral Tab Take 1 tablet (10 mg total) by mouth nightly.      Beclomethasone Dipropionate (QVAR) 40 MCG/ACT Inhalation Aero Soln Inhale 2 puffs into the lungs 2 (two) times daily.        Past Medical History:    Depression    Lipid screening    Mild intermittent asthma (HCC)    Multiple allergies      Social  History:  Social History     Socioeconomic History    Marital status: Single   Tobacco Use    Smoking status: Never    Smokeless tobacco: Never   Substance and Sexual Activity    Alcohol use: Yes     Alcohol/week: 0.0 standard drinks of alcohol     Comment: sometimes    Drug use: No   Other Topics Concern    Caffeine Concern Yes     Comment: 1 cups coffee daily/3 sodas weekly    Reaction to local anesthetic No        REVIEW OF SYSTEMS:   Review of Systems   Constitutional:  Negative for activity change, appetite change, fatigue, fever and unexpected weight change.   HENT:  Negative for congestion, dental problem and sore throat.    Eyes:  Negative for visual disturbance.   Respiratory:  Negative for cough, chest tightness, shortness of breath and wheezing.    Cardiovascular:  Negative for chest pain, palpitations and leg swelling.   Gastrointestinal:  Positive for diarrhea. Negative for abdominal pain, constipation, nausea and vomiting.   Endocrine: Negative.    Genitourinary:  Positive for frequency. Negative for difficulty urinating, dyspareunia, dysuria, flank pain, hematuria, menstrual problem, pelvic pain, urgency, vaginal bleeding, vaginal discharge and vaginal pain.   Musculoskeletal:  Negative for arthralgias and back pain.   Skin:  Negative for color change, pallor, rash and wound.   Neurological:  Negative for dizziness, seizures, light-headedness, numbness and headaches.   Psychiatric/Behavioral:  Negative for behavioral problems, dysphoric mood and suicidal ideas. The patient is not nervous/anxious.           EXAM:   /74 (BP Location: Right arm, Patient Position: Sitting, Cuff Size: adult)   Pulse 79   Ht 5' 5\" (1.651 m)   Wt 194 lb (88 kg)   LMP 04/06/2024   BMI 32.28 kg/m²     Physical Exam  Vitals reviewed. Chaperone present: chaperone declined.   Constitutional:       General: She is not in acute distress.     Appearance: Normal appearance. She is obese. She is not ill-appearing.   HENT:       Head: Normocephalic and atraumatic.      Right Ear: Tympanic membrane, ear canal and external ear normal.      Left Ear: Tympanic membrane, ear canal and external ear normal.      Nose: Nose normal.      Mouth/Throat:      Pharynx: Oropharynx is clear.   Eyes:      General: No scleral icterus.        Right eye: No discharge.         Left eye: No discharge.      Extraocular Movements: Extraocular movements intact.      Conjunctiva/sclera: Conjunctivae normal.      Pupils: Pupils are equal, round, and reactive to light.   Neck:      Thyroid: No thyroid mass or thyromegaly.   Cardiovascular:      Rate and Rhythm: Normal rate and regular rhythm.      Pulses: Normal pulses.      Heart sounds: Normal heart sounds.   Pulmonary:      Effort: Pulmonary effort is normal. No respiratory distress.      Breath sounds: Normal breath sounds.   Abdominal:      General: Abdomen is flat. Bowel sounds are normal. There is no distension.      Palpations: Abdomen is soft. There is no mass.      Tenderness: There is no abdominal tenderness.   Genitourinary:     General: Normal vulva.      Vagina: Normal.      Cervix: Friability present. No cervical motion tenderness or cervical bleeding.   Musculoskeletal:         General: Normal range of motion.      Cervical back: Normal range of motion and neck supple.      Right lower leg: No edema.      Left lower leg: No edema.   Lymphadenopathy:      Cervical: No cervical adenopathy.   Skin:     General: Skin is warm and dry.      Coloration: Skin is not jaundiced.   Neurological:      General: No focal deficit present.      Mental Status: She is alert and oriented to person, place, and time.      Motor: Motor function is intact.      Gait: Gait normal.   Psychiatric:         Mood and Affect: Mood normal.         Judgment: Judgment normal.            ASSESSMENT AND PLAN:   1. Wellness examination  Education provided on healthy lifestyle.  Diet: reduce saturated fats, simple carbs and excess  sugars. Hydrate. Leafy greens, legumes, nuts/seeds, healthy sources of Omega 3, lean proteins, complex carbs, berries.   Exercise 30 min daily cardio as tolerated.   Immunizations reviewed and recommendations provided  Preventative health screening recommendations reviewed.   Previous lab and imaging results reviewed.   - Comp Metabolic Panel (14); Future  - CBC With Differential With Platelet; Future  - Lipid Panel; Future  - TSH W Reflex To Free T4; Future  - Vitamin D, 25-Hydroxy; Future    2. Extrinsic asthma without complication, unspecified asthma severity, unspecified whether persistent (HCC)  -stable, continue Xyzal, montelukast and albuterol PRN. Reviewed concerning s/s. Avoid triggers.     3. Obesity (BMI 30.0-34.9)  -consider referral to weight management, pt will decide.  Currently on Vyvanse with 12 lb weight loss reported.     4. Chronic diarrhea  -Referral to GI, ddx: IBS?   - Gastro Referral - In Network    5. Bipolar affective disorder, remission status unspecified (HCC)  -Stable,. Continue f/u with psychiatry. On Vyvanse and zoloft    6. Urinary frequency  -Urine labs ordered, r/o diabetes, referral to Urology.   - Urinalysis, Routine [E]; Future  - Urine Culture, Routine [E]; Future  - Urology Referral - In Network  - Hemoglobin A1C [E]; Future    7. Vaginosis  -Vaginal culture obtained, unremarkable exam for discharge  - Vaginitis Vaginosis PCR Panel; Future    8. Friable cervix  -Referral to Gyne, pap smear today. STI screening ordered.  - OBG Referral - In Network    9. Venereal disease screening  -STI screening per patient request. Denies concern for STI  - HIV AG AB Combo [E]; Future  - T Pallidum Screening [place] TREP [E]; Future  - Chlamydia/Gc Amplification; Future  - Hepatitis Panel, Acute (4) [E]; Future    10. Encounter for vitamin deficiency screening  - Vitamin D, 25-Hydroxy; Future    11. Encounter for screening for malignant neoplasm of cervix  - ThinPrep PAP Smear; Future  - Hpv  Dna  High Risk , Thin Prep Collect; Future  - ThinPrep PAP Smear  - Hpv Dna  High Risk , Thin Prep Collect    12. Screening for deficiency anemia  - CBC With Differential With Platelet; Future    13. Lipid screening  - Lipid Panel; Future    14. Thyroid disorder screen  - TSH W Reflex To Free T4; Future       The patient indicates understanding of these issues and agrees to the plan.  The patient is asked to return in 3 months.     The above note was creating using Dragon speech recognition technology. Please excuse any typos.

## 2024-04-11 NOTE — TELEPHONE ENCOUNTER
Patient calling, states DANIEL Cisneros had given her a referral for a dietitian and that it is not showing that one on her after visit summary or in her chart.    Are we able to create that for patient as soon as possible?  She said it was the most important one that they had discussed in her visit today.    Call patient back to discuss if needed and let her know the referral is available.

## 2024-04-12 LAB
BV BACTERIA DNA VAG QL NAA+PROBE: NEGATIVE
C GLABRATA DNA VAG QL NAA+PROBE: NEGATIVE
C KRUSEI DNA VAG QL NAA+PROBE: NEGATIVE
CANDIDA DNA VAG QL NAA+PROBE: NEGATIVE
HPV I/H RISK 1 DNA SPEC QL NAA+PROBE: NEGATIVE
T VAGINALIS DNA VAG QL NAA+PROBE: NEGATIVE

## 2024-04-15 ENCOUNTER — TELEPHONE (OUTPATIENT)
Dept: INTERNAL MEDICINE CLINIC | Facility: CLINIC | Age: 29
End: 2024-04-15

## 2024-04-15 ENCOUNTER — TELEMEDICINE (OUTPATIENT)
Dept: INTERNAL MEDICINE CLINIC | Facility: CLINIC | Age: 29
End: 2024-04-15
Payer: MEDICAID

## 2024-04-15 DIAGNOSIS — K52.9 CHRONIC DIARRHEA: Primary | ICD-10-CM

## 2024-04-15 DIAGNOSIS — F31.9 BIPOLAR AFFECTIVE DISORDER, REMISSION STATUS UNSPECIFIED (HCC): ICD-10-CM

## 2024-04-15 DIAGNOSIS — E66.9 OBESITY (BMI 30.0-34.9): ICD-10-CM

## 2024-04-15 PROBLEM — E66.811 OBESITY (BMI 30.0-34.9): Status: ACTIVE | Noted: 2024-04-15

## 2024-04-15 PROCEDURE — 99213 OFFICE O/P EST LOW 20 MIN: CPT | Performed by: NURSE PRACTITIONER

## 2024-04-15 NOTE — TELEPHONE ENCOUNTER
Patient called office. Date of birth and full name both confirmed.   Concerned about liver.   Has many questions that Rn cannot answer - asking if she has liver disease and what to be concerned about.   Advised appointment. Virtual visit.   Appointment made.  She verbalizes understanding of all information, and agreeable to plan.             Lyle Montez,  Your results show LDL cholesterol is elevated, reduce saturated fats in your diet (butter, oils, fried/fast foods).  Blood sugar was slightly low, and ketones in urine, you mentioned that you did not eat anything all day (fasting) which may be the reason. Test negative for diabetes. Liver enzymes are elevated. We should recheck these in 4 weeks.  White blood cell count elevated, possibly from recent cold? WE can recheck in 4 weeks as well.  Chlamydia and gonorrhea tests are negative. HIV test is negative/no infection.  Thyroid level in normal range.  Please go to the lab in 4 weeks to recheck these tests.   Written by ROBY Cronin on 4/11/2024  6:30 PM CDT  Seen by patient Melida Toledo on 4/13/2024  8:50 AM      Future Appointments   Date Time Provider Department Center   4/15/2024  3:20 PM Cecile Ford APRN [id number] [place]   6/11/2024 11:30 AM Kay Noble MD [facility]

## 2024-04-15 NOTE — PROGRESS NOTES
Melida Toledo is a 28 year old female.    This visit is conducted using Telemedicine with live, interactive video and audio.    Patient has been referred to the [facility] website at [email or web address] to review the yearly Consent to Treat document.    Patient understands and accepts financial responsibility for any deductible, co-insurance and/or co-pays associated with this service.   HPI:   Pt f/u on her recent lab results.   Chronic diarrhea, referred to GI, would like testing prior to visit. Denies any new or worsening sx since last visit. Hx of IBS, reports she has had intermittent diarrhea for a few years.   Requests referral to a different dietician.  Pt denies any other complaints at this time.   Current Outpatient Medications   Medication Sig Dispense Refill    VYVANSE 30 MG Oral Cap       prazosin 2 MG Oral Cap Take 1 capsule (2 mg total) by mouth nightly as needed.      sertraline 100 MG Oral Tab Take 1.5 tablets (150 mg total) by mouth daily.      levocetirizine 5 MG Oral Tab Take 1 tablet (5 mg total) by mouth every evening. 30 tablet 1    albuterol (PROAIR HFA) 108 (90 Base) MCG/ACT Inhalation Aero Soln Inhale 2 puffs into the lungs every 4 to 6 hours as needed. 17 g 2    Montelukast Sodium (SINGULAIR) 10 MG Oral Tab Take 1 tablet (10 mg total) by mouth nightly.      Beclomethasone Dipropionate (QVAR) 40 MCG/ACT Inhalation Aero Soln Inhale 2 puffs into the lungs 2 (two) times daily.        Past Medical History:    Depression    Lipid screening    Mild intermittent asthma (HCC)    Multiple allergies      Social History:  Social History     Socioeconomic History    Marital status: Single   Tobacco Use    Smoking status: Never    Smokeless tobacco: Never   Substance and Sexual Activity    Alcohol use: Yes     Alcohol/week: 0.0 standard drinks of alcohol     Comment: sometimes    Drug use: No   Other Topics Concern    Caffeine Concern Yes     Comment: 1 cups coffee daily/3 sodas weekly    Reaction to  local anesthetic No        REVIEW OF SYSTEMS:   Review of Systems   All other systems reviewed and are negative.         EXAM:   LMP 04/06/2024     Physical Exam  Constitutional:       General: She is not in acute distress.  Pulmonary:      Effort: Pulmonary effort is normal. No respiratory distress.   Neurological:      Mental Status: She is alert and oriented to person, place, and time.   Psychiatric:         Mood and Affect: Mood normal.         Judgment: Judgment normal.            ASSESSMENT AND PLAN:   1. Chronic diarrhea  -Keep appt with GI  -Hx of IBS with intermittent diarrhea.  -Stool cultures ordered.   - Stool Culture [E]; Future  - H. Pylori Stool Ag, EIA [E]; Future  - C. diff toxigenic PCR (OPT) [E]; Future  - Ova and Parasites (non-traveler) [E]; Future    2. Obesity (BMI 30.0-34.9)  - DIETITIAN EDUCATION INITIAL, DIET (INTERNAL)    3. Bipolar affective disorder, remission status unspecified (HCC)  -Follows with psychiatry.       The patient indicates understanding of these issues and agrees to the plan.  The patient is asked to return in 4 weeks.     The above note was creating using Dragon speech recognition technology. Please excuse any typos.

## 2024-06-11 ENCOUNTER — OFFICE VISIT (OUTPATIENT)
Dept: GASTROENTEROLOGY | Facility: CLINIC | Age: 29
End: 2024-06-11

## 2024-06-11 VITALS
SYSTOLIC BLOOD PRESSURE: 111 MMHG | HEART RATE: 74 BPM | BODY MASS INDEX: 32.55 KG/M2 | DIASTOLIC BLOOD PRESSURE: 78 MMHG | HEIGHT: 65 IN | WEIGHT: 195.38 LBS

## 2024-06-11 DIAGNOSIS — E66.9 OBESITY (BMI 30-39.9): Primary | ICD-10-CM

## 2024-06-11 DIAGNOSIS — R14.0 BLOATING: ICD-10-CM

## 2024-06-11 DIAGNOSIS — K76.0 FATTY LIVER: ICD-10-CM

## 2024-06-11 DIAGNOSIS — R19.8 IRREGULAR BOWEL HABITS: ICD-10-CM

## 2024-06-11 DIAGNOSIS — R14.0 ABDOMINAL DISTENTION: ICD-10-CM

## 2024-06-11 PROCEDURE — 99204 OFFICE O/P NEW MOD 45 MIN: CPT | Performed by: INTERNAL MEDICINE

## 2024-06-11 RX ORDER — KETOCONAZOLE 20 MG/ML
1 SHAMPOO TOPICAL WEEKLY
COMMUNITY
Start: 2024-04-10

## 2024-06-11 NOTE — PATIENT INSTRUCTIONS
Get an x-ray of your abdomen.   Get your labs checked for celiac disease.   Schedule an ultrasound of your pelvis.   Try eating a Mediterranean diet. [email or web address]

## 2024-06-11 NOTE — H&P
[facility] - Gastroenterology                                                                                                               Reason for consult: diarrhea alternating with constipation, abdominal distention, bloating     Requesting physician or provider: Pablo Ferreira MD    Chief Complaint   Patient presents with    Diarrhea     Bloating        HPI:   Melida Toledo is a 29 year old woman with history of depression and asthma presenting for evaluation of abd bloating and distention and irregular bowel habits.     Previously saw Dr. Tesfaye in 2015:   20-year-old healthy woman returns for follow-up of similar constellation of concerns including erratic bowel patterns mostly with constipation, changing recently with mucoid stools and intermittent diarrhea; abdominal gas and flatulence complaint with fluctuating mild to severe abdominal gas distention.  She has discontinued the previous Abilify and Wellbutrin and now is only on Zoloft.     Suggest:     1.  I again discussed and emphasized the extreme importance of diet here.  Dairy sounds like it is related to some of her symptoms.  Eliminate all diary.  Continue almond milk.  FODMAP diet handout issued and discussed      2.  Continue to seek a daily bowel regimen.  Try going back on stool softeners on a daily basis.  Daily full dose MiraLAX gave her severe diarrhea.  Try aloe vera next.     In the future, could try Linzess medication.     3.   is very concerned about these changes, occasional rectal bleeding, and possibility of colitis or neoplasm.  She wishes to proceed to colonoscopy examination after no response to rifaximin and previous measures.     I recommended colonoscopy examination with possible biopsy, possible polypectomy. We discussed sedation options of conscious sedation versus MAC anesthesia, and agreed on MAC anesthesia. We discussed  the nature and risks of colonoscopy examination including sedation, anesthesia risks; bleeding, colonic injury or perforation, infection. The patient understood these risks and agrees to proceed.  The need for an accompanying adult to provide a ride home or escort home was also discussed.     MiraLAX bowel prep    Today's visit:   She notes abdominal distention and bloating. She feels like abdomen is hard as a rock and feels/looks pregnant. She does not wake up with significant bloating or distention but does not feel it ever fully goes away. Sx worse at the end of the day. Sx happen irregardless of eating. She has not identified any clear trigger foods. She tried a gluten-free diet in the past but did not feel that helped. No longer gluten-free. She eats dairy. She did not feel sx were different with using alternative milk but has never been completely dairy-free. She notes 50lbs weight gain over the past few years. She normally has a BM every 3-4 days. When she does move her bowels, the stool is loose. She denies watery stools. She will normally have 2 episodes of loose stools on the day she moves her bowels. This has been ongoing for the past year with worsening the past few months. She denies abd pain but just notes abd discomfort.     She has never trialed a low FODMAP diet.     She denies family history of GI issues.     She is taking sertraline and vyvanse at home. She takes an OTC allergy medication.     She had recent US of her gallbladder. No gallstones. She was noted to have fatty liver.     Prior endoscopies:  none    Soc:  -denies smoking  -endorses EtOH use: 8 drinks/weekend   -denies thc, illicits     Wt Readings from Last 6 Encounters:   06/11/24 195 lb 6.4 oz (88.6 kg)   04/11/24 194 lb (88 kg)   02/20/24 205 lb (93 kg)   05/17/17 143 lb (64.9 kg)   05/16/17 143 lb (64.9 kg)   05/09/16 144 lb (65.3 kg)        History, Medications, Allergies, ROS:      Past Medical History:    Depression    Lipid  screening    Mild intermittent asthma (HCC)    Multiple allergies      Past Surgical History:   Procedure Laterality Date    Other surgical history Left 2009    Facial cyst removed      Family Hx:   Family History   Problem Relation Age of Onset    Breast Cancer Maternal Aunt 42    Anxiety Brother     Bipolar Disorder Paternal Aunt     Depression Paternal Aunt     Fibromyalgia Maternal Grandmother     Lung Disorder Maternal Grandfather     Dementia Paternal Grandmother     Cancer Paternal Grandfather       Social History:   Social History     Socioeconomic History    Marital status: Single   Tobacco Use    Smoking status: Never    Smokeless tobacco: Never   Vaping Use    Vaping status: Never Used   Substance and Sexual Activity    Alcohol use: Yes     Comment: 4 beers once a week    Drug use: No   Other Topics Concern    Caffeine Concern Yes     Comment: 1 cups coffee daily/3 sodas weekly    Reaction to local anesthetic No        Medications (Active prior to today's visit):  Current Outpatient Medications   Medication Sig Dispense Refill    ketoconazole 2 % External Shampoo Apply 1 Application topically once a week.      VYVANSE 30 MG Oral Cap       prazosin 2 MG Oral Cap Take 1 capsule (2 mg total) by mouth nightly as needed.      sertraline 100 MG Oral Tab Take 1.5 tablets (150 mg total) by mouth daily.      levocetirizine 5 MG Oral Tab Take 1 tablet (5 mg total) by mouth every evening. 30 tablet 1    albuterol (PROAIR HFA) 108 (90 Base) MCG/ACT Inhalation Aero Soln Inhale 2 puffs into the lungs every 4 to 6 hours as needed. 17 g 2    Montelukast Sodium (SINGULAIR) 10 MG Oral Tab Take 1 tablet (10 mg total) by mouth nightly.      Beclomethasone Dipropionate (QVAR) 40 MCG/ACT Inhalation Aero Soln Inhale 2 puffs into the lungs 2 (two) times daily.         Allergies:  Allergies   Allergen Reactions    Grass     Pollen     [place], [place]        ROS:   CONSTITUTIONAL:  negative for fevers, rigors  EYES:  negative for  diplopia   RESPIRATORY:  negative for severe shortness of breath  CARDIOVASCULAR:  negative for crushing sub-sternal chest pain  GASTROINTESTINAL:  see HPI  GENITOURINARY:  negative for dysuria or gross hematuria  INTEGUMENT/BREAST:  SKIN:  negative for jaundice   ALLERGIC/IMMUNOLOGIC:  negative for hay fever  ENDOCRINE:  negative for cold intolerance and heat intolerance  MUSCULOSKELETAL:  negative for joint effusion/severe erythema  BEHAVIOR/PSYCH:  negative for psychotic behavior    PHYSICAL EXAM:   Blood pressure 111/78, pulse 74, height 5' 5\" (1.651 m), weight 195 lb 6.4 oz (88.6 kg), last menstrual period 05/08/2024, not currently breastfeeding.    Gen: appears comfortable and in no acute distress  HEENT: sclera appear anicteric, mucus membranes appear moist  CV: regular rate, the extremities are warm and well-perfused   Lung: no increased work of breathing, no conversational dyspnea   Abd: soft, non-tender exam in all quadrants without rigidity or guarding, non-distended, no masses palpated  Skin: no jaundice, no apparent rashes   Neuro: alert and interactive, no focal neuro deficits  Psych: cooperative, normal affect     Labs/Imaging:     Patient's pertinent labs and imaging were reviewed and discussed with patient today.      ASSESSMENT/PLAN:   Melida Toledo is a 29 year old woman with history of depression and asthma presenting for evaluation of abd bloating and distention and irregular bowel habits.     She presents with similar sx to when she was seen in 2015, namely abdominal distention and bloating and irregular bowel habits. She notes BMs every 3-4 days that are loose. Likely this relates to constipation with overflow diarrhea. Constipation could also explain her abd bloating and distention. Recommended KUB to assess stool burden. If significant, will plan for bowel purge and start daily linzess. Will also r/o celiac disease and obtain a pelvic US to assess for gynecological abnormalities and/or  ascites. If results unrevealing, can consider low FODMAP diet and/or empiric treatment for SIBO. Recommended she try a dairy-free diet for 4 weeks.     She notes significant weight gain in the past few years, which could be contributing. She has gained 50lbs. She is having a hard time losing weight. She had recent US with fatty liver. LFTs with mildly elevated alk phos to 121, elevated AST to 71 and elevated ALT for her age to 41. She drinks about 8 alcoholic drinks a week. Recommended cutting down on alcohol and weight loss. We discussed mediterranean diet. She would like to consider GLP-1 agonists. Recommended referral to bariatrics. If enzymes remain elevated, can complete CLD workup. Her viral hepatitis serologies were negative.     Recommendations:  Get an x-ray of your abdomen.   Get your labs checked for celiac disease.   Schedule an ultrasound of your pelvis.   Try eating a Mediterranean diet. [email or web address]    Orders This Visit:  Orders Placed This Encounter   Procedures    Immunoglobulin A, Quant    Tissue Transglutaminase Ab, IgA       Meds This Visit:  Requested Prescriptions      No prescriptions requested or ordered in this encounter       Imaging & Referrals:  BARIATRICS - INTERNAL  XR ABDOMEN (1 VIEW) (CPT=74018)  US PELVIS (TRANSABDOMINAL AND TRANSVAGINAL) (CPT=76856/44722)       Kay Noble MD  [facility] Gastroenterology  6/11/2024

## 2024-07-12 ENCOUNTER — TELEPHONE (OUTPATIENT)
Facility: CLINIC | Age: 29
End: 2024-07-12

## 2024-07-12 NOTE — TELEPHONE ENCOUNTER
1st Reminder letter [facility]:     Tissue Transglutaminase Ab, IgA (Order #[id number]) on 6/11/24     Immunoglobulin A, Quant (Order #[id number]) on 6/11/24     US PELVIS (TRANSABDOMINAL AND TRANSVAGINAL) (CDX=80151/61658) (Order #[id number]) on 6/11/24     XR ABDOMEN (1 VIEW) (CPT=74018) (Order #[id number]) on 6/11/24

## 2024-08-01 ENCOUNTER — TELEPHONE (OUTPATIENT)
Dept: SURGERY | Facility: CLINIC | Age: 29
End: 2024-08-01

## 2024-08-01 NOTE — TELEPHONE ENCOUNTER
Patient left a voicemail on 7/31/24 @ 4:12 pm. Called back patient and left voicemail to call the office back. ALENA

## 2024-08-15 ENCOUNTER — PATIENT MESSAGE (OUTPATIENT)
Dept: INTERNAL MEDICINE CLINIC | Facility: CLINIC | Age: 29
End: 2024-08-15

## 2024-08-15 DIAGNOSIS — L72.0 MILIA: Primary | ICD-10-CM

## 2024-08-15 DIAGNOSIS — R23.8 SCALP IRRITATION: ICD-10-CM

## 2024-08-19 RX ORDER — LISDEXAMFETAMINE DIMESYLATE 30 MG/1
30 CAPSULE ORAL EVERY MORNING
Qty: 30 CAPSULE | Refills: 0 | OUTPATIENT
Start: 2024-08-19

## 2024-08-19 NOTE — TELEPHONE ENCOUNTER
Please review. Protocol Failed; No Protocol    Medication is listed as patient reported.       Recent fills: 4/10/2024  Last Rx written: 2/22/2024  Last office visit: 4/11/2024          Requested Prescriptions   Pending Prescriptions Disp Refills    VYVANSE 30 MG Oral Cap  0       Controlled Substance Medication Failed - 8/15/2024 12:37 AM        Failed - This medication is a controlled substance - forward to provider to refill               Future Appointments         Provider Department Appt Notes    In 5 months Prosper Roche MD [facility] Obesity, BMI, hanging stomach          Recent Outpatient Visits              2 months ago Obesity (BMI 30-39.9)    [facility] Kay Noble MD    Office Visit    4 months ago Chronic diarrhea    [facility], [address], [place] Cecile Ford APRN    Telemedicine    4 months ago Wellness examination    [facility], [address], [place] Cecile Ford APRN    Office Visit    7 years ago Encounter for gynecological examination with abnormal finding    [facility] Vani Dey CNM    Office Visit    8 years [facility]    [facility] Visit

## 2024-08-21 RX ORDER — LISDEXAMFETAMINE DIMESYLATE 30 MG/1
CAPSULE ORAL
Refills: 0 | OUTPATIENT
Start: 2024-08-21

## 2024-12-06 ENCOUNTER — TELEPHONE (OUTPATIENT)
Dept: SURGERY | Facility: CLINIC | Age: 29
End: 2024-12-06

## 2024-12-28 ENCOUNTER — HOSPITAL ENCOUNTER (EMERGENCY)
Facility: HOSPITAL | Age: 29
Discharge: HOME | End: 2024-12-29
Attending: EMERGENCY MEDICINE
Payer: MEDICAID

## 2024-12-28 ENCOUNTER — APPOINTMENT (OUTPATIENT)
Dept: GENERAL RADIOLOGY | Facility: HOSPITAL | Age: 29
End: 2024-12-28
Attending: EMERGENCY MEDICINE
Payer: MEDICAID

## 2024-12-28 DIAGNOSIS — J45.41 MODERATE PERSISTENT ASTHMA WITH EXACERBATION (HCC): Primary | ICD-10-CM

## 2024-12-28 LAB — LITHIUM SERPL-SCNC: 0.8 MMOL/L (ref 0.6–1.2)

## 2024-12-28 PROCEDURE — 71045 X-RAY EXAM CHEST 1 VIEW: CPT | Performed by: EMERGENCY MEDICINE

## 2024-12-28 PROCEDURE — 99284 EMERGENCY DEPT VISIT MOD MDM: CPT

## 2024-12-28 PROCEDURE — 80178 ASSAY OF LITHIUM: CPT | Performed by: EMERGENCY MEDICINE

## 2024-12-28 PROCEDURE — 36415 COLL VENOUS BLD VENIPUNCTURE: CPT

## 2024-12-28 RX ORDER — PREDNISONE 20 MG/1
40 TABLET ORAL DAILY
Qty: 10 TABLET | Refills: 0 | Status: SHIPPED | OUTPATIENT
Start: 2024-12-28 | End: 2025-01-02

## 2024-12-28 RX ORDER — PREDNISONE 20 MG/1
60 TABLET ORAL ONCE
Status: COMPLETED | OUTPATIENT
Start: 2024-12-28 | End: 2024-12-29

## 2024-12-29 VITALS
OXYGEN SATURATION: 99 % | SYSTOLIC BLOOD PRESSURE: 121 MMHG | DIASTOLIC BLOOD PRESSURE: 90 MMHG | RESPIRATION RATE: 18 BRPM | TEMPERATURE: 100 F | HEART RATE: 75 BPM

## 2024-12-29 NOTE — ED PROVIDER NOTES
Patient Seen in: [facility] Emergency Department    History     Chief Complaint   Patient presents with    Annual       HPI    Patient presents to the ED for multiple issues.  She states that she would like her lithium level checked, recently started this 3 weeks ago.  Also states her asthma has flared and she is short of breath and wheezing.  Denies other complaints.    History reviewed.   Past Medical History:    Depression    Lipid screening    Mild intermittent asthma (HCC)    Multiple allergies       History reviewed.   Past Surgical History:   Procedure Laterality Date    Other surgical history Left 2009    Facial cyst removed         Medications :  Prescriptions Prior to Admission[1]     Family History   Problem Relation Age of Onset    Breast Cancer Maternal Aunt 42    Anxiety Brother     Bipolar Disorder Paternal Aunt     Depression Paternal Aunt     Fibromyalgia Maternal Grandmother     Lung Disorder Maternal Grandfather     Dementia Paternal Grandmother     Cancer Paternal Grandfather        Smoking Status:   Social History     Socioeconomic History    Marital status: Single   Tobacco Use    Smoking status: Never    Smokeless tobacco: Never   Vaping Use    Vaping status: Never Used   Substance and Sexual Activity    Alcohol use: Yes     Comment: 4 beers once a week    Drug use: No   Other Topics Concern    Caffeine Concern Yes     Comment: 1 cups coffee daily/3 sodas weekly    Reaction to local anesthetic No       Constitutional and vital signs reviewed.      Social History and Family History elements reviewed from today, pertinent positives to the presenting problem noted.    Physical Exam     ED Triage Vitals [12/28/24 2054]   /87   Pulse 86   Resp 20   Temp 99.5 °F (37.5 °C)   Temp src    SpO2 100 %   O2 Device None (Room air)       All measures to prevent infection transmission during my interaction with the patient were taken. Handwashing was performed prior to and after the exam.   Stethoscope and any equipment used during my examination was cleaned with super sani-cloth germicidal wipes following the exam.     Physical Exam  Vitals and nursing note reviewed.   Constitutional:       General: She is not in acute distress.     Appearance: She is well-developed.   HENT:      Head: Normocephalic and atraumatic.   Eyes:      General:         Right eye: No discharge.         Left eye: No discharge.      Conjunctiva/sclera: Conjunctivae normal.   Neck:      Trachea: No tracheal deviation.   Cardiovascular:      Rate and Rhythm: Normal rate and regular rhythm.      Heart sounds: Normal heart sounds.   Pulmonary:      Effort: Pulmonary effort is normal. No respiratory distress.      Breath sounds: Normal breath sounds. No stridor.   Abdominal:      General: There is no distension.      Palpations: Abdomen is soft.   Musculoskeletal:         General: No deformity.   Skin:     General: Skin is warm and dry.   Neurological:      Mental Status: She is alert and oriented to person, place, and time.   Psychiatric:         Mood and Affect: Mood normal.         Behavior: Behavior normal.         ED Course        Labs Reviewed   LITHIUM (ESKALITH) - Normal       As Interpreted by me    Imaging Results Available and Reviewed while in ED: No results found.  ED Medications Administered:   Medications   predniSONE (Deltasone) tab 60 mg (has no administration in time range)         MDM     Vitals:    12/28/24 2054   BP: 128/87   Pulse: 86   Resp: 20   Temp: 99.5 °F (37.5 °C)   SpO2: 100%     *I personally reviewed and interpreted all ED vitals.    Pulse Ox: 100%, Room air, Normal     Differential Diagnosis/ Diagnostic Considerations: Asthma exacerbation, elevated lithium levels, other    Complicating Factors: The patient already has does not have any pertinent problems on file. to contribute to the complexity of this ED evaluation.    Medical Decision Making  Patient presents for as exacerbation symptoms.  X-ray  without pneumonia.  Patient given oral steroids and will discharge on the same.  Lithium normal.  Stable for discharge.    Problems Addressed:  Moderate persistent asthma with exacerbation (HCC): acute illness or injury    Amount and/or Complexity of Data Reviewed  Labs: ordered. Decision-making details documented in ED Course.  Radiology: ordered and independent interpretation performed. Decision-making details documented in ED Course.     Details: I personally viewed the patient's chest x-ray image and noted no pneumothorax    Risk  Prescription drug management.        Condition upon leaving the department: Stable    Disposition and Plan     Clinical Impression:  1. Moderate persistent asthma with exacerbation (HCC)        Disposition:  Discharge    Follow-up:  Pablo Ferreira MD  [address] [place] [address]  [phone]    Schedule an appointment as soon as possible for a visit in 3 day(s)        Medications Prescribed:  Current Discharge Medication List        START taking these medications    Details   predniSONE 20 MG Oral Tab Take 2 tablets (40 mg total) by mouth daily for 5 days.  Qty: 10 tablet, Refills: 0                              [1] (Not in a hospital admission)

## 2024-12-29 NOTE — CM/SW NOTE
0013:  [facility] to be [facility] for [facility]. Per CAMI Ordonez RN patient does not have any money or anyone to pick her up. This ERCM confirmed with CAMI Ordonez RN patient able to ambulate safely independently. This ERCM also confirmed patient would like to be discharged to address on face sheet. Per CAMI Ordonez RN is ready to discharge now. This ERCM informed CAMI Ordonez RN to please escort patient to [facility], inform [facility] Triage staff whom patient is and that this ERCM will be calling them with Lyft ETA and  details shortly. CAMI Ordonez RN v/u.    0028:  This ERCM arranged lyft for patient - ETA 2 MIN.    This ERCM informed Sailaja GRUBER in [facility] Triage of lyft ETA and  details and to please ensure pt enters correct lyft upon it's arrival. ALLYN Menard v/u on the above.

## 2024-12-29 NOTE — ED INITIAL ASSESSMENT (HPI)
S: pt has multiple seemingly unrelated presenting problems.  Pt would like her lithium levels checked which she's been taking for 3 weeks.   Pt states she's been having issues with her asthma all day because she's congested.  Pt states that she was on abx for over 2 weeks for uti and would like to verify its gone. When asked if she's still symptomatic pt states she was never symptomatic.

## 2025-01-06 RX ORDER — ALBUTEROL SULFATE 90 UG/1
2 INHALANT RESPIRATORY (INHALATION)
Qty: 17 G | Refills: 2 | Status: SHIPPED | OUTPATIENT
Start: 2025-01-06

## 2025-01-10 ENCOUNTER — HOSPITAL ENCOUNTER (OUTPATIENT)
Dept: GENERAL RADIOLOGY | Age: 30
Discharge: HOME | End: 2025-01-10
Attending: INTERNAL MEDICINE
Payer: MEDICAID

## 2025-01-10 ENCOUNTER — LAB ENCOUNTER (OUTPATIENT)
Dept: LAB | Age: 30
End: 2025-01-10
Attending: INTERNAL MEDICINE
Payer: MEDICAID

## 2025-01-10 DIAGNOSIS — R19.8 IRREGULAR BOWEL HABITS: ICD-10-CM

## 2025-01-10 DIAGNOSIS — R14.0 ABDOMINAL DISTENTION: ICD-10-CM

## 2025-01-10 DIAGNOSIS — D72.829 LEUKOCYTOSIS, UNSPECIFIED TYPE: ICD-10-CM

## 2025-01-10 DIAGNOSIS — R14.0 BLOATING: ICD-10-CM

## 2025-01-10 DIAGNOSIS — R79.89 ELEVATED LFTS: ICD-10-CM

## 2025-01-10 DIAGNOSIS — R74.8 ELEVATED LIVER ENZYMES: ICD-10-CM

## 2025-01-10 LAB
ALBUMIN SERPL-MCNC: 4.4 G/DL (ref 3.2–4.8)
ALP LIVER SERPL-CCNC: 84 U/L
ALT SERPL-CCNC: 90 U/L
AST SERPL-CCNC: 43 U/L (ref ?–34)
BASOPHILS # BLD AUTO: 0.06 X10(3) UL (ref 0–0.2)
BASOPHILS NFR BLD AUTO: 0.4 %
BILIRUB DIRECT SERPL-MCNC: 0.2 MG/DL (ref ?–0.3)
BILIRUB SERPL-MCNC: 0.5 MG/DL (ref 0.3–1.2)
DEPRECATED RDW RBC AUTO: 41.1 FL (ref 35.1–46.3)
EOSINOPHIL # BLD AUTO: 0.31 X10(3) UL (ref 0–0.7)
EOSINOPHIL NFR BLD AUTO: 2.1 %
ERYTHROCYTE [DISTWIDTH] IN BLOOD BY AUTOMATED COUNT: 12.4 % (ref 11–15)
HCT VFR BLD AUTO: 42.8 %
HGB BLD-MCNC: 14.8 G/DL
IGA SERPL-MCNC: 110.2 MG/DL (ref 40–350)
IMM GRANULOCYTES # BLD AUTO: 0.17 X10(3) UL (ref 0–1)
IMM GRANULOCYTES NFR BLD: 1.2 %
LYMPHOCYTES # BLD AUTO: 1.77 X10(3) UL (ref 1–4)
LYMPHOCYTES NFR BLD AUTO: 12.1 %
MCH RBC QN AUTO: 31.6 PG (ref 26–34)
MCHC RBC AUTO-ENTMCNC: 34.6 G/DL (ref 31–37)
MCV RBC AUTO: 91.3 FL
MONOCYTES # BLD AUTO: 0.86 X10(3) UL (ref 0.1–1)
MONOCYTES NFR BLD AUTO: 5.9 %
NEUTROPHILS # BLD AUTO: 11.41 X10 (3) UL (ref 1.5–7.7)
NEUTROPHILS # BLD AUTO: 11.41 X10(3) UL (ref 1.5–7.7)
NEUTROPHILS NFR BLD AUTO: 78.3 %
PLATELET # BLD AUTO: 265 10(3)UL (ref 150–450)
PROT SERPL-MCNC: 6.5 G/DL (ref 5.7–8.2)
RBC # BLD AUTO: 4.69 X10(6)UL
WBC # BLD AUTO: 14.6 X10(3) UL (ref 4–11)

## 2025-01-10 PROCEDURE — 82728 ASSAY OF FERRITIN: CPT

## 2025-01-10 PROCEDURE — 83516 IMMUNOASSAY NONANTIBODY: CPT

## 2025-01-10 PROCEDURE — 74018 RADEX ABDOMEN 1 VIEW: CPT | Performed by: INTERNAL MEDICINE

## 2025-01-10 PROCEDURE — 82390 ASSAY OF CERULOPLASMIN: CPT

## 2025-01-10 PROCEDURE — 86364 TISS TRNSGLTMNASE EA IG CLAS: CPT

## 2025-01-10 PROCEDURE — 36415 COLL VENOUS BLD VENIPUNCTURE: CPT

## 2025-01-10 PROCEDURE — 82784 ASSAY IGA/IGD/IGG/IGM EACH: CPT

## 2025-01-10 PROCEDURE — 85025 COMPLETE CBC W/AUTO DIFF WBC: CPT

## 2025-01-10 PROCEDURE — 80076 HEPATIC FUNCTION PANEL: CPT

## 2025-01-13 DIAGNOSIS — R74.8 ELEVATED LIVER ENZYMES: Primary | ICD-10-CM

## 2025-01-13 DIAGNOSIS — D72.829 LEUKOCYTOSIS, UNSPECIFIED TYPE: ICD-10-CM

## 2025-01-13 LAB — DEPRECATED HBV CORE AB SER IA-ACNC: 59 NG/ML

## 2025-01-14 LAB
CERULOPLASMIN SERPL-MCNC: 27 MG/DL (ref 20–60)
TTG IGA SER-ACNC: <0.2 U/ML (ref ?–7)

## 2025-01-15 ENCOUNTER — OFFICE VISIT (OUTPATIENT)
Dept: SURGERY | Facility: CLINIC | Age: 30
End: 2025-01-15
Payer: MEDICAID

## 2025-01-15 VITALS
HEIGHT: 65 IN | OXYGEN SATURATION: 97 % | WEIGHT: 192.19 LBS | RESPIRATION RATE: 16 BRPM | DIASTOLIC BLOOD PRESSURE: 70 MMHG | BODY MASS INDEX: 32.02 KG/M2 | SYSTOLIC BLOOD PRESSURE: 124 MMHG | HEART RATE: 88 BPM

## 2025-01-15 DIAGNOSIS — K76.0 FATTY LIVER: Primary | ICD-10-CM

## 2025-01-15 DIAGNOSIS — R63.2 BINGE EATING: ICD-10-CM

## 2025-01-15 DIAGNOSIS — F43.9 STRESS: ICD-10-CM

## 2025-01-15 DIAGNOSIS — E66.811 OBESITY (BMI 30.0-34.9): ICD-10-CM

## 2025-01-15 LAB — ACTIN SMOOTH MUSCLE AB: 6 UNITS

## 2025-01-15 PROCEDURE — 99205 OFFICE O/P NEW HI 60 MIN: CPT | Performed by: INTERNAL MEDICINE

## 2025-01-15 RX ORDER — ALPRAZOLAM 0.5 MG
0.5 TABLET ORAL DAILY PRN
COMMUNITY
Start: 2025-01-05

## 2025-01-15 RX ORDER — LITHIUM CARBONATE 300 MG/1
600 CAPSULE ORAL 2 TIMES DAILY
COMMUNITY
Start: 2025-01-06

## 2025-01-15 RX ORDER — OLANZAPINE 10 MG/1
10 TABLET ORAL NIGHTLY
COMMUNITY
Start: 2024-12-26

## 2025-01-15 NOTE — PROGRESS NOTES
[facility] Consultation Note       Patient:  Melida Toledo  :      1995  MRN:      [id number]    Referring Provider: Dr. Noble       Chief Complaint:    Chief Complaint   Patient presents with    Consult       SUBJECTIVE     History of Present Illness:  Melida Toledo has been referred to me for evaluation and treatment.       28 yo who lives with extended family  Ends up eating out  Currently at heaviest weight  Not working    Patient has tried several diets in the past including exercises and is frustrated with increase of weight. Weight has been a struggle for the past several years and is now starting to develop into co-morbidities that are worrisome to the patient. Patient is interested in losing weight, so it can stay off long term.    Patient also understands that this is a life style change and wants to get on track.    Interested in non surgical weight loss      Past Medical History:   Past Medical History:    Depression    Lipid screening    Mild intermittent asthma (HCC)    Multiple allergies    Obesity (BMI 30-39.9)       OBJECTIVE     Vitals: /70   Pulse 88   Resp 16   Ht 5' 5\" (1.651 m)   Wt 192 lb 3.2 oz (87.2 kg)   LMP 2024 (Approximate)   SpO2 97%   BMI 31.98 kg/m²      Patient Medications:    Current Outpatient Medications   Medication Sig Dispense Refill    ALPRAZolam 0.5 MG Oral Tab Take 1 tablet (0.5 mg total) by mouth daily as needed.      lithium carbonate 300 MG Oral Cap Take 2 capsules (600 mg total) by mouth 2 (two) times daily.      metFORMIN 500 MG Oral Tab Take 1 tablet (500 mg total) by mouth 2 (two) times daily.      OLANZapine 10 MG Oral Tab Take 1 tablet (10 mg total) by mouth nightly.      ALBUTEROL 108 (90 Base) MCG/ACT Inhalation Aero Soln INHALE 2 PUFFS INTO THE LUNGS EVERY 4 TO 6 HOURS AS NEEDED 17 g 2    ketoconazole 2 % External Shampoo Apply 1 Application topically once a week.      VYVANSE 30 MG Oral Cap       prazosin 2  MG Oral Cap Take 1 capsule (2 mg total) by mouth nightly as needed.      sertraline 100 MG Oral Tab Take 1.5 tablets (150 mg total) by mouth daily.      Montelukast Sodium (SINGULAIR) 10 MG Oral Tab Take 1 tablet (10 mg total) by mouth nightly.         Allergies:  Grass; Pollen; and Trees, box elder     Comorbidities:      Social History:    Social History     Socioeconomic History    Marital status: Single     Spouse name: Not on file    Number of children: Not on file    Years of education: Not on file    Highest education level: Not on file   Occupational History    Not on file   Tobacco Use    Smoking status: Never    Smokeless tobacco: Never   Vaping Use    Vaping status: Never Used   Substance and Sexual Activity    Alcohol use: Yes     Comment: 4 beers once a week    Drug use: No    Sexual activity: Not on file   Other Topics Concern     Service Not Asked    Blood Transfusions Not Asked    Caffeine Concern Yes     Comment: 1 cups coffee daily/3 sodas weekly    Occupational Exposure Not Asked    Hobby Hazards Not Asked    Sleep Concern Not Asked    Stress Concern Not Asked    Weight Concern Not Asked    Special Diet Not Asked    Back Care Not Asked    Exercise Not Asked    Bike Helmet Not Asked    Seat Belt Not Asked    Self-Exams Not Asked    Grew up on a farm Not Asked    History of tanning Not Asked    Outdoor occupation Not Asked    Pt has a pacemaker Not Asked    Pt has a defibrillator Not Asked    Breast feeding Not Asked    Reaction to local anesthetic No   Social History Narrative    Not on file     Social Drivers of Health     Financial Resource Strain: Not on file   Food Insecurity: Not on file   Transportation Needs: Not on file   Physical Activity: Not on file   Stress: Not on file   Social Connections: Not on file   Housing Stability: Not on file     Surgical History:    Past Surgical History:   Procedure Laterality Date    Other surgical history Left 2009    Facial cyst removed        Family History:    Family History   Problem Relation Age of Onset    Breast Cancer Maternal Aunt 42    Anxiety Brother     Bipolar Disorder Paternal Aunt     Depression Paternal Aunt     Fibromyalgia Maternal Grandmother     Lung Disorder Maternal Grandfather     Dementia Paternal Grandmother     Cancer Paternal Grandfather            Typical Dietary Intake:  Breakfast AM Snack Lunch PM Snack Dinner   Coffee, eggs, toast  sandwich Chips, cheese,  Pasta, chicken, fish, veggies, FF   ++ETOH     Soda Drinker?: Yes  If yes, how much?:  regular    Number of restaurant or fast food meals/week:  10 meals/week    Nutritional Goals Reviewed and Discussed:     Limit carbohydrates to 100 gms per day, Eat 100-200 calories within 1 hour of waking up, and Eat 3-4 cups of fresh fruit or vegetables daily    Behavior Modifications Reviewed and Discussed:    Eat breakfast, Eat 3 meals per day, Plan meals in advance, Read nutrition labels, Drink 64oz of water per day, Maintain a daily food journal, No drinking 30 minutes before or after meals, Utilize portion control strategies to reduce calorie intake, Identify triggers for eating and manage cues, and Eat slowly and take 20 to 30 minutes to complete each meal      ROS:  Constitutional: positive for fatigue  Respiratory: positive for dyspnea on exertion  Cardiovascular: negative  Gastrointestinal: positive for constipation  Musculoskeletal:positive for arthralgias and back pain  Neurological: negative  Behavioral/Psych: positive for anxiety and stress  Endocrine: negative  All other systems were reviewed and are negative.    Physical Exam:  General appearance: alert, appears stated age, cooperative, and moderately obese  Head: Normocephalic, without obvious abnormality, atraumatic  Back: symmetric, no curvature. ROM normal. No CVA tenderness.  Lungs: clear to auscultation bilaterally  Heart: S1, S2 normal, no murmur, click, rub or gallop, regular rate and rhythm  Abdomen:  soft,  obese, non tender  Extremities: extremities normal, atraumatic, no cyanosis or edema  Pulses: 2+ and symmetric  Skin: Skin color, texture, turgor normal. No rashes or lesions  Neurologic: Grossly normal    ASSESSMENT         Encounter Diagnosis(ses):   1. Fatty liver    2. Binge eating    3. Stress    4. Obesity (BMI 30.0-34.9)        PLAN     Patient is not interested in bariatric surgery. Patient desires to pursue traditional weight loss at this time.      Fatty liver: needs to cut out ETOH  Careful with carb intake    Goals for next month:  1. Keep a food log.  2. Drink 48-64 ounces of non-caloric beverages per day. No fruit juices or regular soda.  3. Increase activity-upper body exercises, walk 10 minutes per day.  4. Increase fruit and vegetable servings to 5-6 per day.      Compound semaglutide is a custom-made medication that mimics the GLP-1 hormone. It is used to treat type 2 diabetes and lower the risk of heart or blood vessel disease. It works by increasing insulin release, lowering glucagon release, delaying gastric emptying and reducing appetite. Compound semaglutide is prescribed when an FDA-approved medication, dose, or dosage form is unavailable (ie. Nationwide shortage or no obesity coverage for GLP-1 meds). Patients are aware of the difference between these medications.  Cost of these medications is  dollars monthly based on dose.    Will start at 0.25 mg        Diagnoses and all orders for this visit:    Fatty liver    Binge eating    Stress    Obesity (BMI 30.0-34.9)        Prosper Roche MD

## 2025-01-20 DIAGNOSIS — R74.8 ELEVATED LIVER ENZYMES: Primary | ICD-10-CM

## 2025-02-21 ENCOUNTER — TELEPHONE (OUTPATIENT)
Dept: SURGERY | Facility: CLINIC | Age: 30
End: 2025-02-21

## 2025-02-21 DIAGNOSIS — E66.811 OBESITY (BMI 30.0-34.9): Primary | ICD-10-CM

## 2025-02-21 NOTE — TELEPHONE ENCOUNTER
Patient called stating that she needs to talk to you today because she needs you to up her medication, this has to be done today no fail, if not she will miss her next dose.  gt

## 2025-03-18 ENCOUNTER — PATIENT MESSAGE (OUTPATIENT)
Dept: INTERNAL MEDICINE CLINIC | Facility: CLINIC | Age: 30
End: 2025-03-18

## 2025-03-18 DIAGNOSIS — Z13.21 ENCOUNTER FOR VITAMIN DEFICIENCY SCREENING: ICD-10-CM

## 2025-03-18 DIAGNOSIS — K52.9 CHRONIC DIARRHEA: Primary | ICD-10-CM

## 2025-03-18 DIAGNOSIS — R53.82 CHRONIC FATIGUE: ICD-10-CM

## 2025-03-18 DIAGNOSIS — F31.9 BIPOLAR AFFECTIVE DISORDER, REMISSION STATUS UNSPECIFIED (HCC): ICD-10-CM

## 2025-03-19 NOTE — TELEPHONE ENCOUNTER
RN pended the female hormones lab , but we will wait for the patient's reply for the diagnoses code.     Jesus sent .     LAST visit 4/11/24;  The patient is asked to return in 3 months.     No future appointments.

## 2025-03-21 NOTE — TELEPHONE ENCOUNTER
Patient sent [employer] on 3/20/25 requesting additional lab orders. Lab orders pended.  Attempted to call patient to schedule overdue follow up appointment to facilitate lab request. Phone rings then goes to bust signal, unable to leave message..

## 2025-03-21 NOTE — TELEPHONE ENCOUNTER
Labs pended.   Diagnostic code is needed.    Last telemedicine appointment was on 4/15/24  Please advise .   Thank you.

## 2025-03-24 NOTE — TELEPHONE ENCOUNTER
Please inform patient that mychart messaging is not a substitution for visit. She will need a visit first to discuss any concerns or requests for testing first so that appropriate documentation and coding can be submitted to insurance.

## 2025-03-24 NOTE — TELEPHONE ENCOUNTER
Patient called back, verified Name and . Appointment scheduled.    Future Appointments   Date Time Provider Department Center   3/27/2025  4:00 PM Cecile Ford APRN [facility] [place]   2025  9:15 AM [facility] [place]

## 2025-03-25 ENCOUNTER — TELEPHONE (OUTPATIENT)
Dept: SURGERY | Facility: CLINIC | Age: 30
End: 2025-03-25

## 2025-03-25 DIAGNOSIS — E66.811 OBESITY (BMI 30.0-34.9): Primary | ICD-10-CM

## 2025-03-25 DIAGNOSIS — R63.2 BINGE EATING: ICD-10-CM

## 2025-03-25 RX ORDER — LISDEXAMFETAMINE DIMESYLATE 30 MG/1
30 CAPSULE ORAL DAILY
Qty: 30 CAPSULE | Refills: 0 | Status: SHIPPED | OUTPATIENT
Start: 2025-04-25 | End: 2025-05-25

## 2025-03-25 RX ORDER — LISDEXAMFETAMINE DIMESYLATE 30 MG/1
30 CAPSULE ORAL DAILY
Qty: 30 CAPSULE | Refills: 0 | Status: SHIPPED | OUTPATIENT
Start: 2025-03-25 | End: 2025-04-24

## 2025-03-25 RX ORDER — LISDEXAMFETAMINE DIMESYLATE 30 MG/1
30 CAPSULE ORAL DAILY
Qty: 30 CAPSULE | Refills: 0 | Status: SHIPPED | OUTPATIENT
Start: 2025-05-26 | End: 2025-06-25

## 2025-03-25 NOTE — TELEPHONE ENCOUNTER
Pt called requesting an refill increase in dosage, been on same dosage for nine week no weight loss also dinora eating has increased, pls give pt a call. NK

## 2025-03-26 DIAGNOSIS — E66.811 OBESITY (BMI 30.0-34.9): ICD-10-CM

## 2025-03-27 ENCOUNTER — TELEPHONE (OUTPATIENT)
Dept: SURGERY | Facility: CLINIC | Age: 30
End: 2025-03-27

## 2025-03-27 NOTE — TELEPHONE ENCOUNTER
Patient called again, didn't understand what she was saying about her meds only that she needs it by Tuesday. Please give a call.  gt

## 2025-04-01 ENCOUNTER — TELEPHONE (OUTPATIENT)
Dept: INTERNAL MEDICINE CLINIC | Facility: CLINIC | Age: 30
End: 2025-04-01

## 2025-04-01 DIAGNOSIS — E66.811 OBESITY (BMI 30.0-34.9): Primary | ICD-10-CM

## 2025-04-01 DIAGNOSIS — R53.82 CHRONIC FATIGUE: ICD-10-CM

## 2025-04-01 NOTE — TELEPHONE ENCOUNTER
Unfortunately I do not do this testing, She can see any endo that is available or check with insurance for in-network provider.

## 2025-04-01 NOTE — TELEPHONE ENCOUNTER
ROBY Ford - patient sent [employer] message copied below:    Hi, i was told to go to [facility] for the cortisol and hormone tests- they aren’t available until July. There’s no way i can wait that long to address my concerns. Since you saw me last week can you please put the order in for additional testing    Patient was seen 3/27/25

## 2025-04-01 NOTE — TELEPHONE ENCOUNTER
[phone] (Last signed Verbal Release verified)--> voicemail not set up, unable to leave message    [phone] [# in Epic, not on last signed verbal release]-->contacted and made aware of ROBY Cisneros's interpretation and recommendation. No referral was placed for endocrinology-->referral placed. Contact information provided. She will call endocrinology and gastroenterology offices to schedule with soonest providers. Patient verbalized understanding. No further questions or concerns at this time.

## 2025-04-03 ENCOUNTER — APPOINTMENT (OUTPATIENT)
Dept: LAB | Facility: HOSPITAL | Age: 30
End: 2025-04-03
Attending: NURSE PRACTITIONER
Payer: MEDICAID

## 2025-04-03 PROCEDURE — 87338 HPYLORI STOOL AG IA: CPT

## 2025-04-03 PROCEDURE — 87272 CRYPTOSPORIDIUM AG IF: CPT

## 2025-04-03 PROCEDURE — 87045 FECES CULTURE AEROBIC BACT: CPT

## 2025-04-03 PROCEDURE — 87493 C DIFF AMPLIFIED PROBE: CPT

## 2025-04-03 PROCEDURE — 87329 GIARDIA AG IA: CPT

## 2025-04-03 PROCEDURE — 87015 SPECIMEN INFECT AGNT CONCNTJ: CPT

## 2025-04-03 PROCEDURE — 87046 STOOL CULTR AEROBIC BACT EA: CPT

## 2025-04-03 PROCEDURE — 87427 SHIGA-LIKE TOXIN AG IA: CPT

## 2025-04-04 ENCOUNTER — LAB ENCOUNTER (OUTPATIENT)
Dept: LAB | Age: 30
End: 2025-04-04
Attending: NURSE PRACTITIONER
Payer: MEDICAID

## 2025-04-04 DIAGNOSIS — K52.9 CHRONIC DIARRHEA: ICD-10-CM

## 2025-04-04 DIAGNOSIS — R74.8 ELEVATED LIVER ENZYMES: ICD-10-CM

## 2025-04-04 DIAGNOSIS — D72.829 LEUKOCYTOSIS, UNSPECIFIED TYPE: ICD-10-CM

## 2025-04-04 DIAGNOSIS — F31.9 BIPOLAR AFFECTIVE DISORDER, REMISSION STATUS UNSPECIFIED (HCC): ICD-10-CM

## 2025-04-04 LAB
ALBUMIN SERPL-MCNC: 4.8 G/DL (ref 3.2–4.8)
ALBUMIN/GLOB SERPL: 1.9 {RATIO} (ref 1–2)
ALP LIVER SERPL-CCNC: 111 U/L
ALT SERPL-CCNC: 56 U/L
ANION GAP SERPL CALC-SCNC: 7 MMOL/L (ref 0–18)
AST SERPL-CCNC: 27 U/L (ref ?–34)
BASOPHILS # BLD AUTO: 0.05 X10(3) UL (ref 0–0.2)
BASOPHILS NFR BLD AUTO: 0.4 %
BILIRUB SERPL-MCNC: 0.7 MG/DL (ref 0.3–1.2)
BUN BLD-MCNC: 10 MG/DL (ref 9–23)
BUN/CREAT SERPL: 13 (ref 10–20)
CALCIUM BLD-MCNC: 9.6 MG/DL (ref 8.7–10.4)
CHLORIDE SERPL-SCNC: 104 MMOL/L (ref 98–112)
CHOLEST SERPL-MCNC: 199 MG/DL (ref ?–200)
CO2 SERPL-SCNC: 25 MMOL/L (ref 21–32)
CREAT BLD-MCNC: 0.77 MG/DL
CRYPTOSP AG STL QL IA: NEGATIVE
DEPRECATED RDW RBC AUTO: 39.4 FL (ref 35.1–46.3)
EGFRCR SERPLBLD CKD-EPI 2021: 106 ML/MIN/1.73M2 (ref 60–?)
EOSINOPHIL # BLD AUTO: 0.27 X10(3) UL (ref 0–0.7)
EOSINOPHIL NFR BLD AUTO: 1.9 %
ERYTHROCYTE [DISTWIDTH] IN BLOOD BY AUTOMATED COUNT: 11.6 % (ref 11–15)
FASTING PATIENT LIPID ANSWER: YES
FASTING STATUS PATIENT QL REPORTED: YES
G LAMBLIA AG STL QL IA: NEGATIVE
GLOBULIN PLAS-MCNC: 2.5 G/DL (ref 2–3.5)
GLUCOSE BLD-MCNC: 90 MG/DL (ref 70–99)
HAV AB SER QL IA: NONREACTIVE
HBV CORE AB SERPL QL IA: NONREACTIVE
HBV SURFACE AB SER QL: NONREACTIVE
HBV SURFACE AB SERPL IA-ACNC: 8.1 MIU/ML
HBV SURFACE AG SERPL QL IA: NONREACTIVE
HCT VFR BLD AUTO: 46.4 %
HCV AB SERPL QL IA: NONREACTIVE
HDLC SERPL-MCNC: 52 MG/DL (ref 40–59)
HGB BLD-MCNC: 15.7 G/DL
IMM GRANULOCYTES # BLD AUTO: 0.06 X10(3) UL (ref 0–1)
IMM GRANULOCYTES NFR BLD: 0.4 %
LDLC SERPL CALC-MCNC: 114 MG/DL (ref ?–100)
LITHIUM SERPL-SCNC: 1 MMOL/L (ref 0.6–1.2)
LYMPHOCYTES # BLD AUTO: 1.93 X10(3) UL (ref 1–4)
LYMPHOCYTES NFR BLD AUTO: 13.6 %
MCH RBC QN AUTO: 31.4 PG (ref 26–34)
MCHC RBC AUTO-ENTMCNC: 33.8 G/DL (ref 31–37)
MCV RBC AUTO: 92.8 FL
MONOCYTES # BLD AUTO: 0.67 X10(3) UL (ref 0.1–1)
MONOCYTES NFR BLD AUTO: 4.7 %
NEUTROPHILS # BLD AUTO: 11.19 X10 (3) UL (ref 1.5–7.7)
NEUTROPHILS # BLD AUTO: 11.19 X10(3) UL (ref 1.5–7.7)
NEUTROPHILS NFR BLD AUTO: 79 %
NONHDLC SERPL-MCNC: 147 MG/DL (ref ?–130)
OSMOLALITY SERPL CALC.SUM OF ELEC: 281 MOSM/KG (ref 275–295)
PLATELET # BLD AUTO: 406 10(3)UL (ref 150–450)
POTASSIUM SERPL-SCNC: 4.7 MMOL/L (ref 3.5–5.1)
PROT SERPL-MCNC: 7.3 G/DL (ref 5.7–8.2)
RBC # BLD AUTO: 5 X10(6)UL
SODIUM SERPL-SCNC: 136 MMOL/L (ref 136–145)
TRANSFERRIN SERPL-MCNC: 296 MG/DL (ref 250–380)
TRIGL SERPL-MCNC: 188 MG/DL (ref 30–149)
TSI SER-ACNC: 2.02 UIU/ML (ref 0.55–4.78)
VIT D+METAB SERPL-MCNC: 18.3 NG/ML (ref 30–100)
VLDLC SERPL CALC-MCNC: 33 MG/DL (ref 0–30)
WBC # BLD AUTO: 14.2 X10(3) UL (ref 4–11)

## 2025-04-04 PROCEDURE — 82306 VITAMIN D 25 HYDROXY: CPT

## 2025-04-04 PROCEDURE — 86803 HEPATITIS C AB TEST: CPT

## 2025-04-04 PROCEDURE — 82784 ASSAY IGA/IGD/IGG/IGM EACH: CPT

## 2025-04-04 PROCEDURE — 84466 ASSAY OF TRANSFERRIN: CPT

## 2025-04-04 PROCEDURE — 80061 LIPID PANEL: CPT

## 2025-04-04 PROCEDURE — 80053 COMPREHEN METABOLIC PANEL: CPT

## 2025-04-04 PROCEDURE — 86334 IMMUNOFIX E-PHORESIS SERUM: CPT

## 2025-04-04 PROCEDURE — 80178 ASSAY OF LITHIUM: CPT

## 2025-04-04 PROCEDURE — 86708 HEPATITIS A ANTIBODY: CPT

## 2025-04-04 PROCEDURE — 84165 PROTEIN E-PHORESIS SERUM: CPT

## 2025-04-04 PROCEDURE — 80503 PATH CLIN CONSLTJ SF 5-20: CPT

## 2025-04-04 PROCEDURE — 36415 COLL VENOUS BLD VENIPUNCTURE: CPT

## 2025-04-04 PROCEDURE — 86706 HEP B SURFACE ANTIBODY: CPT

## 2025-04-04 PROCEDURE — 84443 ASSAY THYROID STIM HORMONE: CPT

## 2025-04-04 PROCEDURE — 85025 COMPLETE CBC W/AUTO DIFF WBC: CPT

## 2025-04-04 PROCEDURE — 86704 HEP B CORE ANTIBODY TOTAL: CPT

## 2025-04-04 PROCEDURE — 87340 HEPATITIS B SURFACE AG IA: CPT

## 2025-04-05 LAB — C DIFF TOX B STL QL: NEGATIVE

## 2025-04-06 LAB — H PYLORI AG STL QL IA: NEGATIVE

## 2025-04-07 DIAGNOSIS — D72.829 LEUKOCYTOSIS, UNSPECIFIED TYPE: Primary | ICD-10-CM

## 2025-04-07 LAB
ALBUMIN SERPL ELPH-MCNC: 4.37 G/DL (ref 3.75–5.21)
ALBUMIN/GLOB SERPL: 1.73 {RATIO} (ref 1–2)
ALPHA1 GLOB SERPL ELPH-MCNC: 0.26 G/DL (ref 0.19–0.46)
ALPHA2 GLOB SERPL ELPH-MCNC: 0.73 G/DL (ref 0.48–1.05)
B-GLOBULIN SERPL ELPH-MCNC: 0.79 G/DL (ref 0.68–1.23)
GAMMA GLOB SERPL ELPH-MCNC: 0.74 G/DL (ref 0.62–1.7)
IGA SERPL-MCNC: 122.1 MG/DL (ref 40–350)
IGM SERPL-MCNC: 108.5 MG/DL (ref 50–300)
IMMUNOGLOBULIN PNL SER-MCNC: 838 MG/DL (ref 650–1600)
PROT SERPL-MCNC: 6.9 G/DL (ref 5.7–8.2)

## 2025-04-10 ENCOUNTER — LAB ENCOUNTER (OUTPATIENT)
Dept: LAB | Age: 30
End: 2025-04-10
Attending: OBSTETRICS & GYNECOLOGY
Payer: MEDICAID

## 2025-04-10 ENCOUNTER — OFFICE VISIT (OUTPATIENT)
Dept: OBGYN CLINIC | Facility: CLINIC | Age: 30
End: 2025-04-10
Payer: MEDICAID

## 2025-04-10 VITALS
DIASTOLIC BLOOD PRESSURE: 64 MMHG | HEIGHT: 64.7 IN | HEART RATE: 76 BPM | WEIGHT: 192 LBS | BODY MASS INDEX: 32.38 KG/M2 | SYSTOLIC BLOOD PRESSURE: 97 MMHG

## 2025-04-10 DIAGNOSIS — R35.0 URINARY FREQUENCY: ICD-10-CM

## 2025-04-10 DIAGNOSIS — Z01.411 ENCOUNTER FOR GYNECOLOGICAL EXAMINATION WITH ABNORMAL FINDING: Primary | ICD-10-CM

## 2025-04-10 DIAGNOSIS — Z11.3 SCREEN FOR STD (SEXUALLY TRANSMITTED DISEASE): ICD-10-CM

## 2025-04-10 LAB
BILIRUB UR QL: NEGATIVE
CLARITY UR: CLEAR
COLOR UR: YELLOW
GLUCOSE UR-MCNC: NORMAL MG/DL
HBV SURFACE AG SER-ACNC: <0.1 [IU]/L
HBV SURFACE AG SERPL QL IA: NONREACTIVE
HCV AB SERPL QL IA: NONREACTIVE
KETONES UR-MCNC: NEGATIVE MG/DL
LEUKOCYTE ESTERASE UR QL STRIP.AUTO: NEGATIVE
NITRITE UR QL STRIP.AUTO: NEGATIVE
PH UR: 6 [PH] (ref 5–8)
PROT UR-MCNC: NEGATIVE MG/DL
SP GR UR STRIP: 1.02 (ref 1–1.03)
T PALLIDUM AB SER QL IA: NONREACTIVE
UROBILINOGEN UR STRIP-ACNC: NORMAL

## 2025-04-10 PROCEDURE — 87389 HIV-1 AG W/HIV-1&-2 AB AG IA: CPT

## 2025-04-10 PROCEDURE — 87491 CHLMYD TRACH DNA AMP PROBE: CPT | Performed by: OBSTETRICS & GYNECOLOGY

## 2025-04-10 PROCEDURE — 87591 N.GONORRHOEAE DNA AMP PROB: CPT | Performed by: OBSTETRICS & GYNECOLOGY

## 2025-04-10 PROCEDURE — 87661 TRICHOMONAS VAGINALIS AMPLIF: CPT | Performed by: OBSTETRICS & GYNECOLOGY

## 2025-04-10 PROCEDURE — 86803 HEPATITIS C AB TEST: CPT

## 2025-04-10 PROCEDURE — 36415 COLL VENOUS BLD VENIPUNCTURE: CPT

## 2025-04-10 PROCEDURE — 86780 TREPONEMA PALLIDUM: CPT

## 2025-04-10 PROCEDURE — 81001 URINALYSIS AUTO W/SCOPE: CPT

## 2025-04-10 PROCEDURE — 87340 HEPATITIS B SURFACE AG IA: CPT

## 2025-04-10 NOTE — PROGRESS NOTES
The following individual(s) verbally consented to be recorded using ambient AI listening technology and understand that they can each withdraw their consent to this listening technology at any point by asking the clinician to turn off or pause the recording:    Patient name: Melida Toledo  Additional names:

## 2025-04-11 LAB
C TRACH DNA SPEC QL NAA+PROBE: NEGATIVE
N GONORRHOEA DNA SPEC QL NAA+PROBE: NEGATIVE
T VAGINALIS RRNA SPEC QL NAA+PROBE: NEGATIVE

## 2025-04-14 NOTE — PROGRESS NOTES
Melida Toledo is a 29 year old female  Patient's last menstrual period was 2025 (approximate).   Chief Complaint   Patient presents with    Gyn Exam     ANNUAL EXAM -- new pt    Urinary Symptoms    Sexually Transmitted Infection Screen     Wishes for full screen    Gyn Problem     Chronic odor / numbness   .  History of Present Illness  Melida Toledo is a 29 year old female who presents for an annual gynecological exam.    She has regular monthly menstrual periods and is not currently sexually active. She does not use birth control and relies on the pull-out method when sexually active, as she does not wish to become pregnant. Her last Pap smear in 2024 showed atypical cells of unknown significance, but her HPV test was negative. She has no history of pregnancies, miscarriages, or abortions.    She is concerned about a possible urinary tract infection (UTI) or bacterial vaginosis (BV). She had a UTI around [place], treated with antibiotics, but was asymptomatic at that time. Currently, she experiences urinary frequency, urinating every four minutes for the past week, and notes a chronic odor she associates with BV, though she is unsure if it is a new symptom.    She reports experiencing numbness in her genital area during intimacy, describing a lack of sensation and inability to achieve orgasm.  She has a history of asthma, depression, anxiety, and bipolar disorder. She uses albuterol as needed for asthma. For depression and anxiety, she takes Zoloft and Xanax as needed for acute anxiety issues. Her bipolar disorder is managed with lithium and olanzapine (Zyprexa). She also takes Vyvanse for binge eating. No high blood pressure, diabetes, migraines, seizures, or thyroid problems.    OBSTETRICS HISTORY:     OB History    Para Term  AB Living   0 0 0 0 0 0   SAB IAB Ectopic Multiple Live Births   0 0 0 0 0       GYNE HISTORY:     Periods regular monthly      BCM:   Withdrawal    History   Sexual Activity    Sexual activity: Not on file        Hx Prior Abnormal Pap: No  Pap Date: 24  Pap Result Notes: ASCUS / HPV POS          Latest Ref Rng & Units 2024    10:41 AM 2017     1:38 PM   RECENT PAP RESULTS   INTERPRETATION/RESULT: Negative for intraepithelial lesion or malignancy Atypical squamous cells of undetermined significance (ASC-US)  Negative for intraepithelial lesion or malignancy    HPV Negative Negative           MEDICAL HISTORY:     Past Medical History[1]  Past Surgical History[2]  OB History    Para Term  AB Living   0 0 0 0 0 0   SAB IAB Ectopic Multiple Live Births   0 0 0 0 0        SOCIAL HISTORY:     Tobacco Use: Low Risk  (3/27/2025)    Patient History     Smoking Tobacco Use: Never     Smokeless Tobacco Use: Never     Passive Exposure: Not on file       FAMILY HISTORY:     Family History[3]      MEDICATIONS:     Medications - Current[4]    ALLERGIES:     Allergies[5]      REVIEW OF SYSTEMS:     Constitutional:    denies fever / chills  Eyes:     denies blurred or double vision  Cardiovascular:  denies chest pain or palpitations  Respiratory:    denies shortness of breath  Gastrointestinal:  denies severe abdominal pain, frequent diarrhea or constipation, nausea / vomiting  Genitourinary:    denies dysuria, bothersome incontinence  Skin/Breast:   denies any breast pain, lumps, or discharge  Neurological:    denies frequent severe headaches  Psychiatric:   denies depression or anxiety, thoughts of harming self or others  Heme/Lymph:    denies easy bruising or bleeding      PHYSICAL EXAM:   Blood pressure 97/64, pulse 76, height 5' 4.7\" (1.643 m), weight 192 lb (87.1 kg), last menstrual period 2025, not currently breastfeeding.  Constitutional:  well developed, well nourished  Head/Face:  normocephalic  Neck/Thyroid: thyroid symmetric, no thyromegaly, no nodules, no adenopathy  Lymphatic: no abnormal supraclavicular or axillary  adenopathy is noted  Breast:   normal without palpable masses, tenderness, asymmetry, nipple discharge, nipple retraction or skin changes  Abdomen:   soft, nontender, nondistended, no masses  Skin/Hair:  no unusual rashes or bruises  Extremities:  no edema, no cyanosis  Psychiatric:   oriented to time, place, person and situation. Appropriate mood and affect    Pelvic Exam:  External Genitalia:  normal appearance, hair distribution, and no lesions  Urethral Meatus:   normal in size, location, without lesions and prolapse  Bladder:    no fullness, masses or tenderness  Vagina:    normal appearance without lesions, no abnormal discharge  Cervix:    normal without tenderness on motion  Uterus:    normal in size, contour, position, mobility, without tenderness  Adnexa:   normal without masses or tenderness  Perineum:   normal  Anus: no hemorroids     Results  Procedure: Gonorrhea and Chlamydia Testing  Description: Swab inserted just inside the cervix for a few seconds and then removed    PATHOLOGY  Pap smear: Atypical squamous cells of undetermined significance (ASC-US), HPV negative (04/2024)    ASSESSMENT & PLAN:     Melida was seen today for gyn exam, urinary symptoms, sexually transmitted infection screen and gyn problem.    Diagnoses and all orders for this visit:    Encounter for gynecological examination with abnormal finding    Urinary frequency  -     Urinalysis with Culture Reflex; Future    Screen for STD (sexually transmitted disease)  -     HCV Antibody; Future  -     Hepatitis B Surface Antigen; Future  -     HIV Ag/Ab Combo; Future  -     T Pallidum Screening Cascade; Future  -     Chlamydia/Gc Amplification; Future  -     Trichomonas Vaginitis by DIONNE; Future  -     Chlamydia/Gc Amplification  -     Trichomonas Vaginitis by DIONNE      Assessment & Plan  Annual Gynecological Examination  Jenny, a 29-year-old nulligravida, presents for her annual gynecological examination. She has asthma, depression,  bipolar disorder, and anxiety, managed with albuterol, Xanax, Vyvanse, lithium, olanzapine, and Zoloft. She is not currently sexually active but previously used the withdrawal method, which has a 20% failure rate. She is advised to use condoms for contraception and STD prevention. Her last Pap smear in April 2024 showed atypical cells of unknown significance, but her HPV test was negative, indicating a normal result. She is due for her next Pap smear in April 2027. Annual visits are recommended to monitor changes in her medical history.  - Perform breast and pelvic exams  - Conduct STD testing for gonorrhea, chlamydia, and trichomonas  - Order blood work for HIV, syphilis, hepatitis B and C    Bacterial Vaginosis (BV)  Jenny reports a chronic odor, suggestive of BV, but lacks culture confirmation. Emphasized the importance of confirming BV with cultures before treatment to avoid disrupting normal vaginal angel. Advised that condom use can decrease BV recurrence.  - Perform culture for BV if symptoms persist after UTI treatment    Urinary Tract Infection (UTI)  Jenny reports urinary frequency over the past week, suggesting a possible UTI. She has asymptomatic UTIs and is concerned about a current infection. Informed about the need for a midstream urine sample to avoid contamination.  - Order urinalysis for UTI  - Advise midstream urine sample collection  - Instruct to check MyChart for results and notify office if UTI is confirmed    Decreased Sensation and Anorgasmia  Jenny reports numbness and inability to achieve orgasm, potentially related to her psychiatric medications. Explained that psychotropic medications can decrease sensation and suggested exploring different methods to address this issue.  - Advise exploring sensation through masturbation to assess changes    SUMMARY:  Pap: Next cotest 4/27 per ASCCP guidelines.  BCM:  Withdrawal  STD screening: GC/Chl/Trich/HepB/HepC/HIV/RPR, condoms  encouraged  Mammogram: n/a -- once 40 yrs old   updated  Depression screen:   Depression Screening (PHQ-2/PHQ-9): Over the LAST 2 WEEKS   Little interest or pleasure in doing things: Not at all    Feeling down, depressed, or hopeless: Not at all    PHQ-2 SCORE: 0          FOLLOW-UP     Return in about 1 year (around 4/10/2026) for annual gyne exam.    Note to patient and family:  The 21st Century Cures Act makes medical notes available to patients in the interest of transparency.  However, please be advised that this is a medical document.  It is intended as a peer to peer communication.  It is written in medical language and may contain abbreviations or verbiage that are technical and unfamiliar.  It may appear blunt or direct.  Medical documents are intended to carry relevant information, facts as evident, and the clinical opinion of the practitioner.         [1]   Past Medical History:   Anxiety    Binge eating    Bipolar disorder (HCC)    Depression    Mild intermittent asthma (HCC)    Multiple allergies    Obesity (BMI 30-39.9)   [2]   Past Surgical History:  Procedure Laterality Date    Other surgical history Left 2009    Facial cyst removed   [3]   Family History  Problem Relation Age of Onset    Fibromyalgia Maternal Grandmother     Lung Disorder Maternal Grandfather     Stroke Paternal Grandmother     Dementia Paternal Grandmother     Cancer Paternal Grandfather         ?lung    Anxiety Brother     Breast Cancer Maternal Aunt 42    Bipolar Disorder Paternal Aunt     Depression Paternal Aunt    [4]   Current Outpatient Medications:     CUSTOM MEDICATION, Semaglutide/ cyanocobalamin  1 mg -  Concentration: 1 mg/ 0.5 mL  Amount: 1 ML vial  Instructions: Injection 0.2 mL/ 20 units sq weekly, Disp: 1 each, Rfl: 3    lisdexamfetamine (VYVANSE) 30 MG Oral Cap, Take 1 capsule (30 mg total) by mouth daily., Disp: 30 capsule, Rfl: 0    [START ON 4/25/2025] lisdexamfetamine (VYVANSE) 30 MG Oral Cap, Take 1 capsule  (30 mg total) by mouth daily., Disp: 30 capsule, Rfl: 0    [START ON 5/26/2025] lisdexamfetamine (VYVANSE) 30 MG Oral Cap, Take 1 capsule (30 mg total) by mouth daily., Disp: 30 capsule, Rfl: 0    ALPRAZolam 0.5 MG Oral Tab, Take 1 tablet (0.5 mg total) by mouth daily as needed., Disp: , Rfl:     lithium carbonate 300 MG Oral Cap, Take 2 capsules (600 mg total) by mouth 2 (two) times daily., Disp: , Rfl:     OLANZapine 10 MG Oral Tab, Take 1 tablet (10 mg total) by mouth nightly., Disp: , Rfl:     ALBUTEROL 108 (90 Base) MCG/ACT Inhalation Aero Soln, INHALE 2 PUFFS INTO THE LUNGS EVERY 4 TO 6 HOURS AS NEEDED, Disp: 17 g, Rfl: 2    ketoconazole 2 % External Shampoo, Apply 1 Application topically once a week., Disp: , Rfl:     VYVANSE 30 MG Oral Cap, , Disp: , Rfl:     prazosin 2 MG Oral Cap, Take 1 capsule (2 mg total) by mouth nightly as needed., Disp: , Rfl:     sertraline 100 MG Oral Tab, Take 1.5 tablets (150 mg total) by mouth daily., Disp: , Rfl:   [5]   Allergies  Allergen Reactions    Grass     Pollen     Trees, [place]

## 2025-04-17 ENCOUNTER — TELEPHONE (OUTPATIENT)
Dept: SURGERY | Facility: CLINIC | Age: 30
End: 2025-04-17

## 2025-04-17 DIAGNOSIS — E66.811 OBESITY (BMI 30.0-34.9): Primary | ICD-10-CM

## 2025-04-23 ENCOUNTER — HOSPITAL ENCOUNTER (OUTPATIENT)
Dept: ULTRASOUND IMAGING | Age: 30
Discharge: HOME | End: 2025-04-23
Attending: NURSE PRACTITIONER
Payer: MEDICAID

## 2025-04-23 DIAGNOSIS — R74.8 ELEVATED LIVER ENZYMES: ICD-10-CM

## 2025-04-23 PROCEDURE — 76705 ECHO EXAM OF ABDOMEN: CPT | Performed by: NURSE PRACTITIONER

## 2025-04-24 ENCOUNTER — OFFICE VISIT (OUTPATIENT)
Facility: CLINIC | Age: 30
End: 2025-04-24

## 2025-04-24 VITALS
HEIGHT: 64.7 IN | HEART RATE: 108 BPM | WEIGHT: 192 LBS | SYSTOLIC BLOOD PRESSURE: 102 MMHG | DIASTOLIC BLOOD PRESSURE: 71 MMHG | BODY MASS INDEX: 32.38 KG/M2

## 2025-04-24 DIAGNOSIS — K59.01 SLOW TRANSIT CONSTIPATION: Primary | ICD-10-CM

## 2025-04-24 DIAGNOSIS — R14.0 ABDOMINAL BLOATING: ICD-10-CM

## 2025-04-24 PROCEDURE — 99214 OFFICE O/P EST MOD 30 MIN: CPT | Performed by: NURSE PRACTITIONER

## 2025-04-24 NOTE — PATIENT INSTRUCTIONS
Clearing fecal retention with Miralax (or Dulcolax Balance) and Gatorade    1. Buy one 238 gram bottle of Miralax or Dulcolax Balance. Buy 64 oz of the Gatorade flavor of your choice.     2. Pour the contents of the MiraLAX bottle and the 64 oz of. Gatorade into a pitcher and stir. Let the \"punch\" sit for five minutes for the solution to fully dissolve. This will taste better if you drink it cold.     3. Drink 8 oz. of the \"punch\" every 20 to 30 minutes until completed. Okay to have clear liquids while drinking the laxative. Do not eat solid food while drinking the washout solution.    4. Once completed the solution, wait a few hours and then okay to have a light meal later in the day.      ----------------------------------------------------------  -Then start daily Miralax   -Your weight loss medication makes you more constipated  -Follow up with me in 6 weeks   -Look over low [place] diet info again

## 2025-04-24 NOTE — PROGRESS NOTES
[facility] - Gastroenterology                                                                                                      Clinic Follow-up Visit    Chief Complaint   Patient presents with    Follow - Up     GI issues / diarrhea          Subjective/HPI:   Melida Toledo is a 30 year old year old female with a past medical history of bipolar disorder, chronic constipation, fatty liver.     Previously saw Dr. Tesfaye in 2015:   20-year-old healthy woman returns for follow-up of similar constellation of concerns including erratic bowel patterns mostly with constipation, changing recently with mucoid stools and intermittent diarrhea; abdominal gas and flatulence complaint with fluctuating mild to severe abdominal gas distention.  She has discontinued the previous Abilify and Wellbutrin and now is only on Zoloft.     Suggest:     1.  I again discussed and emphasized the extreme importance of diet here.  Dairy sounds like it is related to some of her symptoms.  Eliminate all diary.  Continue almond milk.  FODMAP diet handout issued and discussed      2.  Continue to seek a daily bowel regimen.  Try going back on stool softeners on a daily basis.  Daily full dose MiraLAX gave her severe diarrhea.  Try aloe vera next.     In the future, could try Linzess medication.     3.   is very concerned about these changes, occasional rectal bleeding, and possibility of colitis or neoplasm.  She wishes to proceed to colonoscopy examination after no response to rifaximin and previous measures.     I recommended colonoscopy examination with possible biopsy, possible polypectomy. We discussed sedation options of conscious sedation versus MAC anesthesia, and agreed on MAC anesthesia. We discussed the nature and risks of colonoscopy examination including sedation, anesthesia risks; bleeding, colonic injury or perforation, infection.  The patient understood these risks and agrees to proceed.  The need for an accompanying adult to provide a ride home or escort home was also discussed.     MiraLAX bowel prep     6/11/24 Dr. Noble:  She notes abdominal distention and bloating. She feels like abdomen is hard as a rock and feels/looks pregnant. She does not wake up with significant bloating or distention but does not feel it ever fully goes away. Sx worse at the end of the day. Sx happen irregardless of eating. She has not identified any clear trigger foods. She tried a gluten-free diet in the past but did not feel that helped. No longer gluten-free. She eats dairy. She did not feel sx were different with using alternative milk but has never been completely dairy-free. She notes 50lbs weight gain over the past few years. She normally has a BM every 3-4 days. When she does move her bowels, the stool is loose. She denies watery stools. She will normally have 2 episodes of loose stools on the day she moves her bowels. This has been ongoing for the past year with worsening the past few months. She denies abd pain but just notes abd discomfort.      She has never trialed a low FODMAP diet.      She denies family history of GI issues.      She is taking sertraline and vyvanse at home. She takes an OTC allergy medication.      She had recent US of her gallbladder. No gallstones. She was noted to have fatty liver.     Today:  Here to discuss chronic constipation.   Was having diarrhea last month. Now is back to having bowel movement 1-2 times per week.   Excessive stool burden on abdominal x-ray Jan 2025.   Has been on GLP for the past 3 months, is not finding it effective so far.   States she eats dairy \"all day every day\"       PRIOR GI WORK UP:   Endoscopies:  none    Wt Readings from Last 6 Encounters:   04/24/25 192 lb (87.1 kg)   04/10/25 192 lb (87.1 kg)   03/27/25 192 lb (87.1 kg)   01/15/25 192 lb 3.2 oz (87.2 kg)   06/11/24 195 lb 6.4 oz (88.6  kg)   24 194 lb (88 kg)        History, Medications, Allergies, ROS:      Past Medical History[1]   Past Surgical History[2]   Family History[3]   Social History: Short Social Hx on File[4]     Medications (Active prior to today's visit):  Current Medications[5]    Allergies:  Allergies[6]    ROS:   CONSTITUTIONAL: negative for fevers, chills, sweats  EYES Negative for scleral icterus or redness, and diplopia  HEENT: Negative for hoarseness  RESPIRATORY: Negative for cough and severe shortness of breath  CARDIOVASCULAR: Negative for crushing sub-sternal chest pain  GASTROINTESTINAL: See HPI  GENITOURINARY: Negative for dysuria  MUSCULOSKELETAL: Negative for arthralgias and myalgias  SKIN: Negative for jaundice, rash or pruritus  NEUROLOGICAL: Negative for dizziness and headaches  BEHAVIOR/PSYCH: Negative for psychotic behavior    PHYSICAL EXAM:   Blood pressure 102/71, pulse 108, height 5' 4.7\" (1.643 m), weight 192 lb (87.1 kg), last menstrual period 2025, not currently breastfeeding.    Gen- alert, no acute distress, well-nourished  Abdomen- Soft, symmetrical, non-tender without distention or guarding. No scars or lesions. Aorta is without bruit or visible pulsation. Umbilicus is midline without herniation. Normoactive bowel sounds are present, No masses, hepatomegaly or splenomegaly noted.  MSK: no erythema, warmth, no swelling of joints  Skin- No jaundice, erythema, rashes or lesions  Hematologic- no bleeding or bruising  Neuro- Alert and interactive, DIAZ   Psych - appropriate mood & affect    Labs/Imagin/10/25:  Narrative   PROCEDURE: XR ABDOMEN (1 VIEW) (CPT=74018)     COMPARISON: None.     INDICATIONS: Chronic abdominal pain and bloating.     TECHNIQUE:   Single view.       FINDINGS:  BOWEL GAS PATTERN: There are no dilated loops of large or small bowel. Large amount of excessive stool seen throughout the colon.  SOFT TISSUES: Normal. No masses or organomegaly.  CALCIFICATIONS: None  significant.  BONES: Normal. No significant arthritic changes.  OTHER: Negative.  No abnormal gaseous collections.                 Impression   CONCLUSION: Large amount of excessive stool seen throughout the colon suggestive of constipation. No obstruction.          .  ASSESSMENT/PLAN:   Melida Toledo is a 30 year old year old female with a past medical history of bipolar disorder, chronic constipation, fatty liver.     1. Slow transit constipation    2. Abdominal bloating     Dicussed that prior diarrhea was likely overflow due to chronic constipation.   Will complete bowel cleanse and then start daily Miralax.  Will be increasing dose on GLP, advised this will likely cause an increase in constipation.   Requesting colonoscopy, not advised at this time. No acute change in bowel habits, has been constipated for over 10 years. Dicussed likely IBS-C diagnosis.   Had not tried reducing dairy or the low fodmap diet as recommended at last visit, info given again. Has already had negative celiac testing last year.   Follow up in 6 weeks to discuss next steps regarding constipation.         Patient Instructions   Clearing fecal retention with Miralax (or Dulcolax Balance) and Gatorade    1. Buy one 238 gram bottle of Miralax or Dulcolax Balance. Buy 64 oz of the Gatorade flavor of your choice.     2. Pour the contents of the MiraLAX bottle and the 64 oz of. Gatorade into a pitcher and stir. Let the \"punch\" sit for five minutes for the solution to fully dissolve. This will taste better if you drink it cold.     3. Drink 8 oz. of the \"punch\" every 20 to 30 minutes until completed. Okay to have clear liquids while drinking the laxative. Do not eat solid food while drinking the washout solution.    4. Once completed the solution, wait a few hours and then okay to have a light meal later in the day.      ----------------------------------------------------------  -Then start daily Miralax   -Your weight loss medication makes  you more constipated  -Follow up with me in 6 weeks   -Look over low Fodmap diet info again     Orders This Visit:  No orders of the defined types were placed in this encounter.      Meds This Visit:  Requested Prescriptions      No prescriptions requested or ordered in this encounter       Imaging & Referrals:  None     ROBY Byers    [facility]  4/24/2025    The dictation was partially prepared using Dragon Medical voice recognition software. As a result, errors may occur. When identified, these errors have been corrected. While every attempt is made to correct errors during dictation, discrepancies may still exist.            [1]   Past Medical History:   Anxiety    Binge eating    Bipolar disorder (HCC)    Depression    Mild intermittent asthma (HCC)    Multiple allergies    Obesity (BMI 30-39.9)   [2]   Past Surgical History:  Procedure Laterality Date    Other surgical history Left 2009    Facial cyst removed   [3]   Family History  Problem Relation Age of Onset    Fibromyalgia Maternal Grandmother     Lung Disorder Maternal Grandfather     Stroke Paternal Grandmother     Dementia Paternal Grandmother     Cancer Paternal Grandfather         ?lung    Anxiety Brother     Breast Cancer Maternal Aunt 42    Bipolar Disorder Paternal Aunt     Depression Paternal Aunt    [4]   Social History  Socioeconomic History    Marital status: Single   Tobacco Use    Smoking status: Never    Smokeless tobacco: Never   Vaping Use    Vaping status: Never Used   Substance and Sexual Activity    Alcohol use: Yes     Comment: 4 beers once a week    Drug use: No   Other Topics Concern    Caffeine Concern Yes     Comment: 1 cups coffee daily/3 sodas weekly    Reaction to local anesthetic No     Social Drivers of Health     Food Insecurity: No Food Insecurity (4/10/2025)    NCSS - Food Insecurity     Worried About Running Out of Food in the Last Year: No     Ran Out of Food in the Last Year: No    Transportation Needs: No Transportation Needs (4/10/2025)    NCSS - Transportation     Lack of Transportation: No   Housing Stability: Not At Risk (4/10/2025)    NCSS - Housing/Utilities     Has Housing: Yes     Worried About Losing Housing: No     Unable to Get Utilities: No   [5]   Current Outpatient Medications   Medication Sig Dispense Refill    CUSTOM MEDICATION Semaglutide/ cyanocobalamin    1.7 mg-   Concentration: 5 mg/ 0.5 mL  Amount: 2.5 ML   Instructions: Inject 34 units/ aka 0.34 mL sq q weekly 1 each 5    lisdexamfetamine (VYVANSE) 30 MG Oral Cap Take 1 capsule (30 mg total) by mouth daily. 30 capsule 0    [START ON 4/25/2025] lisdexamfetamine (VYVANSE) 30 MG Oral Cap Take 1 capsule (30 mg total) by mouth daily. 30 capsule 0    [START ON 5/26/2025] lisdexamfetamine (VYVANSE) 30 MG Oral Cap Take 1 capsule (30 mg total) by mouth daily. 30 capsule 0    ALPRAZolam 0.5 MG Oral Tab Take 1 tablet (0.5 mg total) by mouth daily as needed.      lithium carbonate 300 MG Oral Cap Take 2 capsules (600 mg total) by mouth 2 (two) times daily.      OLANZapine 10 MG Oral Tab Take 1 tablet (10 mg total) by mouth nightly.      ALBUTEROL 108 (90 Base) MCG/ACT Inhalation Aero Soln INHALE 2 PUFFS INTO THE LUNGS EVERY 4 TO 6 HOURS AS NEEDED 17 g 2    ketoconazole 2 % External Shampoo Apply 1 Application topically once a week.      VYVANSE 30 MG Oral Cap       prazosin 2 MG Oral Cap Take 1 capsule (2 mg total) by mouth nightly as needed.      sertraline 100 MG Oral Tab Take 1.5 tablets (150 mg total) by mouth daily.     [6]   Allergies  Allergen Reactions    Grass     Pollen     Trees, Box Elder

## 2025-04-30 ENCOUNTER — OFFICE VISIT (OUTPATIENT)
Dept: SURGERY | Facility: CLINIC | Age: 30
End: 2025-04-30
Payer: MEDICAID

## 2025-04-30 VITALS
BODY MASS INDEX: 31.49 KG/M2 | HEART RATE: 104 BPM | DIASTOLIC BLOOD PRESSURE: 72 MMHG | RESPIRATION RATE: 16 BRPM | SYSTOLIC BLOOD PRESSURE: 110 MMHG | HEIGHT: 65 IN | OXYGEN SATURATION: 98 % | WEIGHT: 189 LBS

## 2025-04-30 DIAGNOSIS — E66.811 OBESITY (BMI 30.0-34.9): ICD-10-CM

## 2025-04-30 DIAGNOSIS — F43.9 STRESS: ICD-10-CM

## 2025-04-30 DIAGNOSIS — Z51.81 ENCOUNTER FOR THERAPEUTIC DRUG MONITORING: ICD-10-CM

## 2025-04-30 DIAGNOSIS — R63.2 BINGE EATING: ICD-10-CM

## 2025-04-30 DIAGNOSIS — K76.0 FATTY LIVER: Primary | ICD-10-CM

## 2025-04-30 PROCEDURE — 99214 OFFICE O/P EST MOD 30 MIN: CPT | Performed by: INTERNAL MEDICINE

## 2025-04-30 NOTE — PROGRESS NOTES
[facility]  [address] [facility] [address] [place] [id number]  Dept: [phone]       Patient:  Melida Toledo  :      1995  MRN:      [id number]    Chief Complaint:    Chief Complaint   Patient presents with    Follow - Up    Weight Management       SUBJECTIVE     History of Present Illness:  Melida is being seen today for a follow-up for non surgical weight loss    Past Medical History: Past Medical History[1]     Comorbidities:      OBJECTIVE     Vitals: /72   Pulse 104   Resp 16   Ht 5' 5\" (1.651 m)   Wt 189 lb (85.7 kg)   LMP 2025 (Approximate)   SpO2 98%   BMI 31.45 kg/m²     Initial weight loss: -03   Total weight loss: -03    Start weight: 192    Wt Readings from Last 3 Encounters:   25 189 lb (85.7 kg)   25 192 lb (87.1 kg)   04/10/25 192 lb (87.1 kg)       Patient Medications:  Current Medications[2]  Allergies:  Grass; Pollen; and Trees, box elder     Social History:    Social History     Socioeconomic History    Marital status: Single     Spouse name: Not on file    Number of children: Not on file    Years of education: Not on file    Highest education level: Not on file   Occupational History    Not on file   Tobacco Use    Smoking status: Never    Smokeless tobacco: Never   Vaping Use    Vaping status: Never Used   Substance and Sexual Activity    Alcohol use: Yes     Comment: 4 beers once a week    Drug use: No    Sexual activity: Not on file   Other Topics Concern     Service Not Asked    Blood Transfusions Not Asked    Caffeine Concern Yes     Comment: 1 cups coffee daily/3 sodas weekly    Occupational Exposure Not Asked    Hobby Hazards Not Asked    Sleep Concern Not Asked    Stress Concern Not Asked    Weight Concern Not Asked    Special Diet Not Asked    Back Care Not Asked    Exercise Not Asked    Bike Helmet Not Asked    Seat Belt Not Asked    Self-Exams Not Asked    Grew up on a farm Not  Asked    History of tanning Not Asked    Outdoor occupation Not Asked    Pt has a pacemaker Not Asked    Pt has a defibrillator Not Asked    Breast feeding Not Asked    Reaction to local anesthetic No   Social History Narrative    Not on file     Social Drivers of Health     Food Insecurity: No Food Insecurity (4/10/2025)    NCSS - Food Insecurity     Worried About Running Out of Food in the Last Year: No     Ran Out of Food in the Last Year: No   Transportation Needs: No Transportation Needs (4/10/2025)    NCSS - Transportation     Lack of Transportation: No   Stress: Not on file   Housing Stability: Not At Risk (4/10/2025)    NCSS - Housing/Utilities     Has Housing: Yes     Worried About Losing Housing: No     Unable to Get Utilities: No     Surgical History:  Past Surgical History[3]  Family History:  Family History[4]    Food Journal  Reviewed and Discussed:       Patient has a Food Journal?: yes   Patient is reading nutrition labels?  yes  Average Caloric Intake:     Average CHO Intake: 130  Is patient exercising? no  Type of exercise?     Eating Habits  Patient states the following:  Eats 3 meal(s) per day  Length of time it takes to consume a meal:  20  # of snacks per day: 1 Type of snacks:  cheese its, cookies  Amount of soda consumption per day:  regular  Amount of water (in ounces) per day:  64  Drinking between meals only:  yes  Toughest challenge:      Nutritional Goals  Limit carbohydrates to 100 gms per day, Eat 100-200 calories within 1 hour of waking , and Eat 3-4 cups of fresh fruits or vegetables daily    Behavior Modifications Reviewed and Discussed  Eat breakfast, Eat 3 meals per day, Plan meals in advance, Read nutrition labels, Drink 64 oz of water per day, Maintain a daily food journal, No drinking 30 minutes before or after meals, Utlize portion control strategies to reduce calorie intake, Identify triggers for eating and manage cues, and Eat slowly and take 20 to 30 minutes to complete  each meal    Exercise Goals Reviewed and Discussed    Increase as tolerated    ROS:    Constitutional: positive for fatigue  Respiratory: negative  Cardiovascular: negative  Gastrointestinal: negative  Musculoskeletal:negative  Neurological: negative  Behavioral/Psych: negative  All other systems were reviewed and are negative    Physical Exam:   General appearance: alert, appears stated age, cooperative, and moderately obese  Head: Normocephalic, without obvious abnormality, atraumatic  Eyes: conjunctivae/corneas clear. PERRL, EOM's intact. Fundi benign.  Back: symmetric, no curvature. ROM normal. No CVA tenderness.  Lungs: clear to auscultation bilaterally  Heart: S1, S2 normal, no murmur, click, rub or gallop, regular rate and rhythm  Abdomen: soft, non-tender; bowel sounds normal; no masses,  no organomegaly  Extremities: extremities normal, atraumatic, no cyanosis or edema  Pulses: 2+ and symmetric  Skin: Skin color, texture, turgor normal. No rashes or lesions  Neurologic: Grossly normal    ASSESSMENT       Encounter Diagnosis(ses):   Encounter Diagnoses   Name Primary?    Fatty liver Yes    Binge eating     Stress     Encounter for therapeutic drug monitoring     Obesity (BMI 30.0-34.9)        PLAN     Patient is not interested in bariatric surgery. Patient desires to pursue traditional weight loss at this time.        Binge eating: not controlled    Stress: may benefit from a therapist    Fatty liver: may improve with diet and exercise    Goals for next month:  1. Keep a food log.  2. Drink 48-64 ounces of non-caloric beverages per day. No fruit juices or regular soda.  3. Increase activity-upper body exercises, walk 10 minutes per day.  4. Increase fruit and vegetable servings to 5-6 per day.      Semaglutide:continue weekly    Should start Vyvanse      Diagnoses and all orders for this visit:    Fatty liver    Binge eating    Stress    Encounter for therapeutic drug monitoring    Obesity (BMI  30.0-34.9)          Prosper Roche MD       [1]   Past Medical History:   Anxiety    Binge eating    Bipolar disorder (HCC)    Depression    Mild intermittent asthma (HCC)    Multiple allergies    Obesity (BMI 30-39.9)   [2]   Current Outpatient Medications   Medication Sig Dispense Refill    CUSTOM MEDICATION Semaglutide/ cyanocobalamin    1.7 mg-   Concentration: 5 mg/ 0.5 mL  Amount: 2.5 ML   Instructions: Inject 34 units/ aka 0.34 mL sq q weekly 1 each 5    lisdexamfetamine (VYVANSE) 30 MG Oral Cap Take 1 capsule (30 mg total) by mouth daily. 30 capsule 0    [START ON 5/26/2025] lisdexamfetamine (VYVANSE) 30 MG Oral Cap Take 1 capsule (30 mg total) by mouth daily. 30 capsule 0    ALPRAZolam 0.5 MG Oral Tab Take 1 tablet (0.5 mg total) by mouth daily as needed.      lithium carbonate 300 MG Oral Cap Take 2 capsules (600 mg total) by mouth 2 (two) times daily.      OLANZapine 10 MG Oral Tab Take 1 tablet (10 mg total) by mouth nightly.      ALBUTEROL 108 (90 Base) MCG/ACT Inhalation Aero Soln INHALE 2 PUFFS INTO THE LUNGS EVERY 4 TO 6 HOURS AS NEEDED 17 g 2    ketoconazole 2 % External Shampoo Apply 1 Application topically once a week.      VYVANSE 30 MG Oral Cap       prazosin 2 MG Oral Cap Take 1 capsule (2 mg total) by mouth nightly as needed.      sertraline 100 MG Oral Tab Take 1.5 tablets (150 mg total) by mouth daily.     [3]   Past Surgical History:  Procedure Laterality Date    Other surgical history Left 2009    Facial cyst removed   [4]   Family History  Problem Relation Age of Onset    Fibromyalgia Maternal Grandmother     Lung Disorder Maternal Grandfather     Stroke Paternal Grandmother     Dementia Paternal Grandmother     Cancer Paternal Grandfather         ?lung    Anxiety Brother     Breast Cancer Maternal Aunt 42    Bipolar Disorder Paternal Aunt     Depression Paternal Aunt

## 2025-05-05 DIAGNOSIS — K80.20 CALCULUS OF GALLBLADDER WITHOUT CHOLECYSTITIS WITHOUT OBSTRUCTION: Primary | ICD-10-CM

## 2025-05-22 RX ORDER — LEVOCETIRIZINE DIHYDROCHLORIDE 5 MG/1
5 TABLET, FILM COATED ORAL EVERY EVENING
Qty: 30 TABLET | Refills: 1 | OUTPATIENT
Start: 2025-05-22

## 2025-06-16 DIAGNOSIS — R11.0 NAUSEA: Primary | ICD-10-CM

## 2025-06-16 RX ORDER — ONDANSETRON 4 MG/1
4 TABLET, FILM COATED ORAL EVERY 8 HOURS PRN
Qty: 20 TABLET | Refills: 2 | Status: SHIPPED | OUTPATIENT
Start: 2025-06-16

## 2025-06-17 ENCOUNTER — TELEPHONE (OUTPATIENT)
Dept: ENDOCRINOLOGY CLINIC | Facility: CLINIC | Age: 30
End: 2025-06-17

## 2025-06-17 NOTE — TELEPHONE ENCOUNTER
Closed    Close reason: Other  Payer: [employer] Generic Payer  Note from payer: Sorry but [employer] cannot process this PA request electronically. Please refer to the back of the patient benefit card for information on how to process this prior authorization manually. - Prescriber details have been updated to match the prescriber directory.    We received a PA request for ergocalciferol and dexamethasone. This is atypical and does not usually require PA. Endo MA team can you please call pharmacy to inquire further? Thanks.

## 2025-06-18 NOTE — TELEPHONE ENCOUNTER
Called patients pharmacy [facility] at [phone] and spoke to pharmacy tech who stated no PA was required and script is ready to be picked up, no further action was needed

## 2025-07-02 ENCOUNTER — LAB ENCOUNTER (OUTPATIENT)
Dept: LAB | Age: 30
End: 2025-07-02
Attending: INTERNAL MEDICINE
Payer: MEDICAID

## 2025-07-02 DIAGNOSIS — F31.70 BIPOLAR AFFECTIVE DISORDER IN REMISSION (HCC): ICD-10-CM

## 2025-07-02 DIAGNOSIS — R79.89 LOW VITAMIN D LEVEL: ICD-10-CM

## 2025-07-02 DIAGNOSIS — E07.9 THYROID DISEASE: ICD-10-CM

## 2025-07-02 DIAGNOSIS — R53.83 FATIGUE, UNSPECIFIED TYPE: ICD-10-CM

## 2025-07-02 DIAGNOSIS — E78.1 HIGH TRIGLYCERIDES: ICD-10-CM

## 2025-07-02 LAB — CORTIS SERPL-MCNC: 1.8 UG/DL

## 2025-07-02 PROCEDURE — 36415 COLL VENOUS BLD VENIPUNCTURE: CPT | Performed by: INTERNAL MEDICINE

## 2025-07-02 PROCEDURE — 84443 ASSAY THYROID STIM HORMONE: CPT | Performed by: INTERNAL MEDICINE

## 2025-07-02 PROCEDURE — 84439 ASSAY OF FREE THYROXINE: CPT | Performed by: INTERNAL MEDICINE

## 2025-07-02 PROCEDURE — 82533 TOTAL CORTISOL: CPT

## 2025-08-29 DIAGNOSIS — E66.811 OBESITY (BMI 30.0-34.9): ICD-10-CM

## (undated) NOTE — Clinical Note
5/27/2017              [facility]         [id number] [facility]        [place] [address]         Dear Nicki Anderson,    It was a pleasure to see you. Your PAP test was normal. Current guidelines recommend a repeat pap in 3 years.   An annual wellness exam i

## (undated) NOTE — MR AVS SNAPSHOT
[address] [facility]  [phone]               Thank you for choosing us for your health care visit with Elenita Mackay CNM.   We are glad to serve you and happy to provide you with this summary INHALE 2 PUFFS BY INHALATION ROUTE  EVERY 4 - 6 HOURS AS NEEDED           QVAR 40 MCG/ACT Aers   Generic drug:  Beclomethasone Dipropionate   Inhale 2 puffs into the lungs 2 (two) times daily.                 Where to Get Your Medications      These medicat metaplastic cells present    General Categorization        Negative for intraepithelial lesion or malignancy    Final Diagnosis Comment {\rtf1\ansi\axkwcun3082\ftnbj\uc1 {\rtf1\omxjxe54847\ansi\vnxajru7872\ftnbj\uc1\deff0{\fonttbl{\f0 \fswiss MS Sans Serif MS Sans Serif;}{\f1 \fswiss \fcharset0 MS Sans Serif;}}{\colortbl ;\vpn525\vpbpt966\bucx364 ;\red0\green0\blue0 ;}{\stylesheet{\f0\fs20 Normal;}{\cs1 Default Paragraph Font;}}{\*\revtbl{Unknown;}}\emfboy61482\hiuwdo33150\ynjbz5914\cpfcb2607\kqebw7924\[facility] Font;}}{\*\revtbl{Unknown;}}\jeyrif67704\hdvunr34846\jftck3959\wyecx8206\yptcg4265\srlee1179\jolpqwz809\ihdvzjr909\nogrowautofit\sihnih017\formshade\nofeaturethrottle1\dntblnsbdb\fet4\aendnotes\aftnnrlc\pgbrdrhead\pgbrdrfoot\sectd\xldlxx27141\gpkvai76443\g

## (undated) NOTE — ED AVS SNAPSHOT
[facility] AND [facility] in [address] [address] [place]    Phone:  [phone]    Fax:  [address]   MRN: [id number]    Department:  [facility] AND [facility] in [address] [place] [address]   Date of Visit:  5/ TOSHA -[phone], [phone]  [address] [place] [address]     Phone:  [phone]    - Albuterol Sulfate  (90 Base) MCG/ACT Aers  - azithromycin 500 MG Tabs  - predniSONE 20 MG Tabs              Discharge Instructions       Vicky Cancer this physician (or your personal doctor if your instructions are to return to your personal doctor) about any new or lasting problems. The primary care or specialist physician may see patients referred from the [facility].  [place]  [address] [employer]. ([address]) [address] [facility] [employer]. ([address]) [place] [address] ([facility]) [phone]   Tamiko Flores [address] [employer]  [facility] & medical emergencies, dial 911.